# Patient Record
Sex: FEMALE | Race: WHITE | URBAN - METROPOLITAN AREA
[De-identification: names, ages, dates, MRNs, and addresses within clinical notes are randomized per-mention and may not be internally consistent; named-entity substitution may affect disease eponyms.]

---

## 2022-06-10 ENCOUNTER — PROCEDURE VISIT (OUTPATIENT)
Dept: OBGYN CLINIC | Age: 27
End: 2022-06-10
Payer: COMMERCIAL

## 2022-06-10 DIAGNOSIS — Z3A.08 8 WEEKS GESTATION OF PREGNANCY: ICD-10-CM

## 2022-06-10 DIAGNOSIS — O36.80X0 ENCOUNTER TO DETERMINE FETAL VIABILITY OF PREGNANCY, SINGLE OR UNSPECIFIED FETUS: Primary | ICD-10-CM

## 2022-06-10 PROCEDURE — 76817 TRANSVAGINAL US OBSTETRIC: CPT | Performed by: OBSTETRICS & GYNECOLOGY

## 2022-06-15 ENCOUNTER — INITIAL PRENATAL (OUTPATIENT)
Dept: OBGYN CLINIC | Age: 27
End: 2022-06-15

## 2022-06-15 VITALS — WEIGHT: 171.4 LBS | HEIGHT: 62 IN | BODY MASS INDEX: 31.54 KG/M2

## 2022-06-15 DIAGNOSIS — Z82.79 FAMILY HISTORY OF DOWN SYNDROME: ICD-10-CM

## 2022-06-15 DIAGNOSIS — Z82.49 FAMILY HISTORY OF HEART FAILURE: ICD-10-CM

## 2022-06-15 DIAGNOSIS — Z92.29 COVID-19 VACCINE SERIES COMPLETED: ICD-10-CM

## 2022-06-15 DIAGNOSIS — Z34.01 ENCOUNTER FOR SUPERVISION OF NORMAL FIRST PREGNANCY IN FIRST TRIMESTER: Primary | ICD-10-CM

## 2022-06-15 DIAGNOSIS — Z3A.09 9 WEEKS GESTATION OF PREGNANCY: ICD-10-CM

## 2022-06-15 PROBLEM — Z34.90 PREGNANCY: Status: ACTIVE | Noted: 2022-06-15

## 2022-06-15 RX ORDER — CALCIUM CARBONATE 200(500)MG
1 TABLET,CHEWABLE ORAL DAILY
COMMUNITY

## 2022-06-15 NOTE — PROGRESS NOTES
Christie Gruber is here today for NOB talk. Today we discussed scheduled appointments, scheduled ultrasounds, nutrition, appropriate weight gain, exercise, travel, genetic testing, hospital classes and registration, breastfeeding/lactation services, flu vaccine, Tdap, glucola, GBS, COVID 19, and Zika virus. She elects to have genetic testing,NIPT. Her father recently had a heart transplant for heart failure, it was discovered at that time that he has a genetic heart issue. At this time, she does not know what type of condition he has. She is scheduled to have genetic testing next month and will share her results once she knows more, as well as her father's results. Anatomy at [facility]. She will return tomorrow for her PN labs. She is asked to return to the office in 3 weeks for NOB exam. All questions answered. Pt is encouraged to call the office with any questions or concerns. Orders Placed This Encounter   Procedures    Culture, Urine     Standing Status:   Future     Standing Expiration Date:   6/15/2023     Order Specific Question:   Specify (ex-cath, midstream, cysto, etc)?      Answer:   midstream    CBC with Auto Differential     Standing Status:   Future     Standing Expiration Date:   6/15/2023    Hepatitis B Surface Antigen     Standing Status:   Future     Standing Expiration Date:   6/15/2023    Hepatitis C Antibody     Standing Status:   Future     Standing Expiration Date:   6/15/2023    HIV 1/2 Ag/Ab, 4TH Generation,W Rflx Confirm     Standing Status:   Future     Standing Expiration Date:   6/15/2023    RPR     Standing Status:   Future     Standing Expiration Date:   6/15/2023    Rubella antibody, IgG     Standing Status:   Future     Standing Expiration Date:   6/15/2023    ABO/Rh     Standing Status:   Future     Standing Expiration Date:   6/15/2023    Antibody Screen     Standing Status:   Future     Standing Expiration Date:   6/15/2023

## 2022-06-16 ENCOUNTER — NURSE ONLY (OUTPATIENT)
Dept: OBGYN CLINIC | Age: 27
End: 2022-06-16

## 2022-06-16 DIAGNOSIS — Z34.01 ENCOUNTER FOR SUPERVISION OF NORMAL FIRST PREGNANCY IN FIRST TRIMESTER: ICD-10-CM

## 2022-06-17 LAB
ABO + RH BLD: NORMAL
BLOOD GROUP ANTIBODIES SERPL: NORMAL

## 2022-06-18 LAB
BASOPHILS # BLD: 0.1 K/UL (ref 0–0.2)
BASOPHILS NFR BLD: 0 % (ref 0–2)
DIFFERENTIAL METHOD BLD: ABNORMAL
EOSINOPHIL # BLD: 0.2 K/UL (ref 0–0.8)
EOSINOPHIL NFR BLD: 1 % (ref 0.5–7.8)
ERYTHROCYTE [DISTWIDTH] IN BLOOD BY AUTOMATED COUNT: 13.6 % (ref 11.9–14.6)
HBV SURFACE AG SER QL: NONREACTIVE
HCT VFR BLD AUTO: 38.5 % (ref 35.8–46.3)
HCV AB SER QL: NONREACTIVE
HGB BLD-MCNC: 11.6 G/DL (ref 11.7–15.4)
HIV 1+2 AB+HIV1 P24 AG SERPL QL IA: NONREACTIVE
HIV 1/2 RESULT COMMENT: NORMAL
IMM GRANULOCYTES # BLD AUTO: 0.1 K/UL (ref 0–0.5)
IMM GRANULOCYTES NFR BLD AUTO: 0 % (ref 0–5)
LYMPHOCYTES # BLD: 2.7 K/UL (ref 0.5–4.6)
LYMPHOCYTES NFR BLD: 19 % (ref 13–44)
MCH RBC QN AUTO: 27 PG (ref 26.1–32.9)
MCHC RBC AUTO-ENTMCNC: 30.1 G/DL (ref 31.4–35)
MCV RBC AUTO: 89.5 FL (ref 79.6–97.8)
MONOCYTES # BLD: 0.9 K/UL (ref 0.1–1.3)
MONOCYTES NFR BLD: 6 % (ref 4–12)
NEUTS SEG # BLD: 10.5 K/UL (ref 1.7–8.2)
NEUTS SEG NFR BLD: 73 % (ref 43–78)
NRBC # BLD: 0 K/UL (ref 0–0.2)
PLATELET # BLD AUTO: 271 K/UL (ref 150–450)
PMV BLD AUTO: 10.2 FL (ref 9.4–12.3)
RBC # BLD AUTO: 4.3 M/UL (ref 4.05–5.2)
RPR SER QL: NONREACTIVE
RUBV IGG SERPL IA-ACNC: 17.6 IU/ML (ref 0–50)
WBC # BLD AUTO: 14.5 K/UL (ref 4.3–11.1)

## 2022-06-21 LAB
BACTERIA SPEC CULT: ABNORMAL
BACTERIA SPEC CULT: ABNORMAL
SERVICE CMNT-IMP: ABNORMAL

## 2022-06-22 ENCOUNTER — TELEPHONE (OUTPATIENT)
Dept: OBGYN CLINIC | Age: 27
End: 2022-06-22

## 2022-06-22 PROBLEM — N39.0 UTI (URINARY TRACT INFECTION): Status: ACTIVE | Noted: 2022-06-22

## 2022-06-22 RX ORDER — AMOXICILLIN 500 MG/1
500 CAPSULE ORAL 3 TIMES DAILY
Qty: 21 CAPSULE | Refills: 0 | Status: SHIPPED | OUTPATIENT
Start: 2022-06-22 | End: 2022-06-29

## 2022-06-22 NOTE — TELEPHONE ENCOUNTER
----- Message from Lindsey Isidro MD sent at 6/21/2022  8:24 AM EDT -----  Labs are abnormal.  Amoxil 500 TID for 7 days and re culture

## 2022-06-22 NOTE — TELEPHONE ENCOUNTER
Called pt, made aware of results and Dr Valerie Gan recommendations. Rx to be sent to her pharmacy. She is also c/o increased vomiting at night. She is going to try vitamin B6 and unisom. She is already taking a daily tagamet. She is asked to call back if her sx fail to improve. All questions answered. Call back prn. Voiced full understanding.      Orders Placed This Encounter    amoxicillin (AMOXIL) 500 MG capsule     Sig: Take 1 capsule by mouth 3 times daily for 7 days     Dispense:  21 capsule     Refill:  0

## 2022-06-28 ENCOUNTER — TELEPHONE (OUTPATIENT)
Dept: OBGYN CLINIC | Age: 27
End: 2022-06-28

## 2022-06-28 DIAGNOSIS — Z34.01 ENCOUNTER FOR SUPERVISION OF NORMAL FIRST PREGNANCY IN FIRST TRIMESTER: Primary | ICD-10-CM

## 2022-06-28 NOTE — TELEPHONE ENCOUNTER
----- Message from Quang Rosenbaum MD sent at 6/21/2022  8:24 AM EDT -----  Labs are abnormal.  Amoxil 500 TID for 7 days and re culture

## 2022-06-28 NOTE — TELEPHONE ENCOUNTER
Patient's PN labs were not picked up on 06/16/22, they were left in the box all night. Labs were not stable to run, pt will need to be redrawn. Per Dr Amanda Bardales she would prefer the patient have these labs done prior to her next appt. Called pt, made aware of above. She is going to come back in on Thursday, 06/30. She is aware that she should not get a bill for the blood work that was preformed 06/16. All questions answered call back prn. Voiced full understanding.      Orders Placed This Encounter    CBC with Auto Differential     Standing Status:   Future     Standing Expiration Date:   6/28/2023    Hepatitis B Surface Antigen     Standing Status:   Future     Standing Expiration Date:   6/28/2023    HIV 1/2 Ag/Ab, 4TH Generation,W Rflx Confirm     Standing Status:   Future     Standing Expiration Date:   6/28/2023    Rubella antibody, IgG     Standing Status:   Future     Standing Expiration Date:   6/28/2023    RPR     Standing Status:   Future     Standing Expiration Date:   6/28/2023    ABO/Rh     Standing Status:   Future     Standing Expiration Date:   6/28/2023    Antibody Screen     Standing Status:   Future     Standing Expiration Date:   6/28/2023

## 2022-06-30 ENCOUNTER — NURSE ONLY (OUTPATIENT)
Dept: OBGYN CLINIC | Age: 27
End: 2022-06-30

## 2022-06-30 DIAGNOSIS — Z34.01 ENCOUNTER FOR SUPERVISION OF NORMAL FIRST PREGNANCY IN FIRST TRIMESTER: ICD-10-CM

## 2022-06-30 LAB
ABO + RH BLD: NORMAL
BASOPHILS # BLD: 0 K/UL (ref 0–0.2)
BASOPHILS NFR BLD: 0 % (ref 0–2)
BLOOD GROUP ANTIBODIES SERPL: NORMAL
DIFFERENTIAL METHOD BLD: ABNORMAL
EOSINOPHIL # BLD: 0.1 K/UL (ref 0–0.8)
EOSINOPHIL NFR BLD: 1 % (ref 0.5–7.8)
ERYTHROCYTE [DISTWIDTH] IN BLOOD BY AUTOMATED COUNT: 12.9 % (ref 11.9–14.6)
HCT VFR BLD AUTO: 37.2 % (ref 35.8–46.3)
HGB BLD-MCNC: 12.4 G/DL (ref 11.7–15.4)
HIV 1+2 AB+HIV1 P24 AG SERPL QL IA: NONREACTIVE
HIV 1/2 RESULT COMMENT: NORMAL
IMM GRANULOCYTES # BLD AUTO: 0.1 K/UL (ref 0–0.5)
IMM GRANULOCYTES NFR BLD AUTO: 1 % (ref 0–5)
LYMPHOCYTES # BLD: 2.1 K/UL (ref 0.5–4.6)
LYMPHOCYTES NFR BLD: 17 % (ref 13–44)
MCH RBC QN AUTO: 26.8 PG (ref 26.1–32.9)
MCHC RBC AUTO-ENTMCNC: 33.3 G/DL (ref 31.4–35)
MCV RBC AUTO: 80.3 FL (ref 79.6–97.8)
MONOCYTES # BLD: 0.8 K/UL (ref 0.1–1.3)
MONOCYTES NFR BLD: 6 % (ref 4–12)
NEUTS SEG # BLD: 9.5 K/UL (ref 1.7–8.2)
NEUTS SEG NFR BLD: 76 % (ref 43–78)
NRBC # BLD: 0 K/UL (ref 0–0.2)
PLATELET # BLD AUTO: 299 K/UL (ref 150–450)
PMV BLD AUTO: 9.8 FL (ref 9.4–12.3)
RBC # BLD AUTO: 4.63 M/UL (ref 4.05–5.2)
WBC # BLD AUTO: 12.5 K/UL (ref 4.3–11.1)

## 2022-07-01 LAB
ABO, EXTERNAL RESULT: NORMAL
HBV SURFACE AG SER QL: NONREACTIVE
HEP B, EXTERNAL RESULT: NON REACTIVE
HIV, EXTERNAL RESULT: NON REACTIVE
RH FACTOR, EXTERNAL RESULT: POSITIVE
RPR, EXTERNAL RESULT: NON REACTIVE
RUBELLA TITER, EXTERNAL RESULT: NORMAL
RUBV IGG SERPL IA-ACNC: 19.4 IU/ML (ref 0–50)

## 2022-07-02 LAB — RPR SER QL: NON REACTIVE

## 2022-07-05 ENCOUNTER — PATIENT MESSAGE (OUTPATIENT)
Dept: OBGYN CLINIC | Age: 27
End: 2022-07-05

## 2022-07-06 RX ORDER — ONDANSETRON HYDROCHLORIDE 8 MG/1
8 TABLET, FILM COATED ORAL EVERY 8 HOURS PRN
Qty: 20 TABLET | Refills: 2 | Status: SHIPPED | OUTPATIENT
Start: 2022-07-06

## 2022-07-06 NOTE — PROGRESS NOTES
Chief Complaint: This 31 y.o. female presents today for an initial obstetrical examination. She c/o nausea and vomiting sent in zofran yesterday and this is helping. She is having headaches as well. Denies cramping, ctx, bleeding, spotting, bleeding. Dad being tested and she is being tested- already swabbed- - \" gene of undetermined significance\"- father with heart transplant Goes to [address] [place] [address] in [facility] will be followed by those cardiologists- new drug in development-  Last pap 21 wnl, cultures neg  Not enough urine sample to check-will attempt again before leaving today   Genetic test re heart condition pending      No components found for: Eliz Givens, OBEXTRUBELLA, OBEXTGRBS, OBEXTHBSAG, OBEXTHIV, OBEXTRPR, OBEXTTPPA, [address], [address] [facility], [address], [id number]        LMP:  Patient's last menstrual period was 2022. EDC:  Estimated Date of Delivery: 23    OB History:    OB History    Para Term  AB Living   1   0 0 0 0   SAB IAB Ectopic Molar Multiple Live Births                    # Outcome Date GA Lbr Guy/2nd Weight Sex Delivery Anes PTL Lv   1 Current               Allergies:  No Known Allergies    Medication History:  Current Outpatient Medications   Medication Sig Dispense Refill    ondansetron (ZOFRAN) 8 MG tablet Take 1 tablet by mouth every 8 hours as needed for Nausea or Vomiting 20 tablet 2    Prenat w/o K-GX-Vtcxigc-FA-DHA (PNV-DHA PO) Take by mouth      calcium carbonate (TUMS) 500 MG chewable tablet Take 1 tablet by mouth daily       No current facility-administered medications for this visit.        Past Medical History:  Past Medical History:   Diagnosis Date    COVID        Past Surgical History:  Past Surgical History:   Procedure Laterality Date    WISDOM TOOTH EXTRACTION         Social History:  Social History     Socioeconomic History    Marital status:      Spouse name: Not on file    Number of children: Not on file  Years of education: Not on file    Highest education level: Not on file   Occupational History    Not on file   Tobacco Use    Smoking status: Never Smoker    Smokeless tobacco: Never Used   Vaping Use    Vaping Use: Never used   Substance and Sexual Activity    Alcohol use: Not Currently    Drug use: No    Sexual activity: Yes     Partners: Male     Birth control/protection: None   Other Topics Concern    Not on file   Social History Narrative    Not on file     Social Determinants of Health     Financial Resource Strain:     Difficulty of Paying Living Expenses: Not on file   Food Insecurity:     Worried About Running Out of Food in the Last Year: Not on file    Christi of Food in the Last Year: Not on file   Transportation Needs:     Lack of Transportation (Medical): Not on file    Lack of Transportation (Non-Medical):  Not on file   Physical Activity:     Days of Exercise per Week: Not on file    Minutes of Exercise per Session: Not on file   Stress:     Feeling of Stress : Not on file   Social Connections:     Frequency of Communication with Friends and Family: Not on file    Frequency of Social Gatherings with Friends and Family: Not on file    Attends Restorationism Services: Not on file    Active Member of [address] [place] [address] [employer] or Organizations: Not on file    Attends Club or Organization Meetings: Not on file    Marital Status: Not on file   Intimate Partner Violence:     Fear of Current or Ex-Partner: Not on file    Emotionally Abused: Not on file    Physically Abused: Not on file    Sexually Abused: Not on file   Housing Stability:     Unable to Pay for Housing in the Last Year: Not on file    Number of [place] in the Last Year: Not on file    Unstable Housing in the Last Year: Not on file       Family History:  Family History   Problem Relation Age of Onset    Heart Disease Mother     Heart Failure Father         heart transplant    Heart Attack Mother 40       Ultrasound results: normal    Review of Systems   Constitutional: Negative. HENT: Negative. Eyes: Negative. Respiratory: Negative. Cardiovascular: Negative. Gastrointestinal: negative  Genitourinary: Negative. Musculoskeletal: Negative. Skin: Negative. Neurological: Negative. Endo/Heme/Allergies: Negative. Psychiatric/Behavioral: Negative. LMP 04/14/2022     Physical Exam:  Patient is a 31 y.o.  female who appears pleasant, in no apparent distress, her given age, well developed, well nourished and with good attention to hygiene and body habitus. Oriented to person, place and time. Mood and affect normal and appropriate to situation. Head is normocephalic, atraumatic, without any gross head or neck masses. HEENT:Head & Face: Examination of head and face is unremarkable  Thyroid is smooth and symmetric with enlargement consistent with pregnancy, no tenderness or masses noted. No neck lymphadenopathy noted. No supraclavicular lymphadenopathy noted. Skin: No skin rash, subcutaneous nodules, lesions or ulcers observed. No edema observed. Cardiovascular: Heart auscultation reveals no murmurs, gallop, rubs or clicks. Respiratory: Lungs CTA. Breast: Chest (Breasts): Symmetrical, no: dimpling, retractions, adenopathy, dominant masses or nipple discharge elicited. No axillary lymphadenopathy noted. Fibrocystic change:  yes  Abdomen: Abdomen soft, nontender, bowel sounds present x 4 without palpable masses. Palpation of liver reveals no abnormalities with respect to size, tenderness or masses. Palpation of spleen reveals no abnormalities with respect to size, tenderness or masses. No groin lymphadenopathy noted. Pelvic Dimensions:       Arch is normal.    Gynecoid is yes. Genitourinary: External genitalia are normal in appearance. Examination of urethra shows no abnormalities. Examination of vaginal vault reveals no abnormalities.    Cervix is long and closed without pathology. Uterine portion of bimanual exam reveals contour normal, shape regular and size normal for gestation. ~ 12 wk size. Adnexa and parametria show no masses, tenderness, organomegaly or nodularity. Examination of anus and perineum shows no abnormalities. Rectal exam reveals normal sphincter tone without presence of hemorrhoids or masses. Test Results:     Impression: Patient is pregnant. 2.  Flu shot  advised/offered, accepted  3. Dtap vaccine advised offered after 28 weeks accepted    Plan: Pap smear done 12/21     Counseling:  adverse effects of alcohol, breast vs bottle feeding, childbirth classes, environment or work hazards, lifestyle changes, negative effects of smoking, physical activity, prenatal nutrition, sexual activity, toxoplasmosis precautions which includes avoidance of cat feces and raw material, listeria precautions, traveling, screening for chromosomal and genetic problems, Nuchal translucency, MSAFP testing, peripheral maternal blood draw for fetal cells and chromosomes, pros & cons of testing reviewed. Patient accepted testing. Sending to [facility] for genetic testing and anatomy secondary to [facility] DS and genetic heart condition              Discussed diet, exercise and recommended weight gain. All questions answered. Check urine culture    Encounter Diagnoses   Name Primary?  11 weeks gestation of pregnancy     Encounter for supervision of normal first pregnancy in first trimester Yes    Screening for genitourinary condition     Screening examination for venereal disease      No follow-up provider specified.

## 2022-07-06 NOTE — TELEPHONE ENCOUNTER
From: Norberto Keene  To: Dr. Small Breaker: 7/5/2022 6:53 PM EDT  Subject: Nausea    My nausea has continually gotten worse (only occurs in the afternoon/evenings). I was trying to wait it out until I got off my antibiotic for the UTI but it has continually gotten worse even after those are over. Is there anything else I can take or do to relieve the symptoms?

## 2022-07-07 ENCOUNTER — ROUTINE PRENATAL (OUTPATIENT)
Dept: OBGYN CLINIC | Age: 27
End: 2022-07-07
Payer: COMMERCIAL

## 2022-07-07 VITALS
BODY MASS INDEX: 31.91 KG/M2 | WEIGHT: 173.4 LBS | SYSTOLIC BLOOD PRESSURE: 116 MMHG | DIASTOLIC BLOOD PRESSURE: 80 MMHG | HEIGHT: 62 IN

## 2022-07-07 DIAGNOSIS — Z82.79 FAMILY HISTORY OF DOWN SYNDROME: ICD-10-CM

## 2022-07-07 DIAGNOSIS — Z34.01 ENCOUNTER FOR SUPERVISION OF NORMAL FIRST PREGNANCY IN FIRST TRIMESTER: Primary | ICD-10-CM

## 2022-07-07 DIAGNOSIS — Z11.3 SCREENING EXAMINATION FOR VENEREAL DISEASE: ICD-10-CM

## 2022-07-07 DIAGNOSIS — Z3A.11 11 WEEKS GESTATION OF PREGNANCY: ICD-10-CM

## 2022-07-07 DIAGNOSIS — Z13.89 SCREENING FOR GENITOURINARY CONDITION: ICD-10-CM

## 2022-07-07 LAB
GLUCOSE URINE, POC: NEGATIVE
PROTEIN,URINE, POC: NEGATIVE

## 2022-07-07 PROCEDURE — 81002 URINALYSIS NONAUTO W/O SCOPE: CPT | Performed by: OBSTETRICS & GYNECOLOGY

## 2022-07-07 PROCEDURE — 99902 PR PRENATAL VISIT: CPT | Performed by: OBSTETRICS & GYNECOLOGY

## 2022-07-11 PROBLEM — O98.511 COVID-19 AFFECTING PREGNANCY IN FIRST TRIMESTER: Status: ACTIVE | Noted: 2022-07-11

## 2022-07-11 PROBLEM — U07.1 COVID-19 AFFECTING PREGNANCY IN FIRST TRIMESTER: Status: ACTIVE | Noted: 2022-07-11

## 2022-07-11 LAB
C TRACH RRNA SPEC QL NAA+PROBE: NEGATIVE
N GONORRHOEA RRNA SPEC QL NAA+PROBE: NEGATIVE
SPECIMEN SOURCE: NORMAL
T VAGINALIS RRNA SPEC QL NAA+PROBE: NEGATIVE

## 2022-07-22 PROBLEM — N39.0 UTI (URINARY TRACT INFECTION): Status: RESOLVED | Noted: 2022-06-22 | Resolved: 2022-07-22

## 2022-08-05 ENCOUNTER — ROUTINE PRENATAL (OUTPATIENT)
Dept: OBGYN CLINIC | Age: 27
End: 2022-08-05
Payer: COMMERCIAL

## 2022-08-05 VITALS
BODY MASS INDEX: 33.27 KG/M2 | SYSTOLIC BLOOD PRESSURE: 116 MMHG | DIASTOLIC BLOOD PRESSURE: 74 MMHG | HEIGHT: 62 IN | WEIGHT: 180.8 LBS

## 2022-08-05 DIAGNOSIS — Z34.02 ENCOUNTER FOR SUPERVISION OF NORMAL FIRST PREGNANCY IN SECOND TRIMESTER: Primary | ICD-10-CM

## 2022-08-05 DIAGNOSIS — Z13.89 SCREENING FOR GENITOURINARY CONDITION: ICD-10-CM

## 2022-08-05 DIAGNOSIS — Z3A.16 16 WEEKS GESTATION OF PREGNANCY: ICD-10-CM

## 2022-08-05 LAB
GLUCOSE URINE, POC: NEGATIVE
PROTEIN,URINE, POC: NEGATIVE

## 2022-08-05 PROCEDURE — 99902 PR PRENATAL VISIT: CPT | Performed by: OBSTETRICS & GYNECOLOGY

## 2022-08-05 PROCEDURE — 81002 URINALYSIS NONAUTO W/O SCOPE: CPT | Performed by: OBSTETRICS & GYNECOLOGY

## 2022-08-05 RX ORDER — ACETAMINOPHEN 160 MG
TABLET,DISINTEGRATING ORAL
COMMUNITY

## 2022-08-05 NOTE — PROGRESS NOTES
C/o n/v but improving, headaches    Denies bleeding, spotting, cramping, ctx    Has questions regarding supplements, she had covid and added aspirin up to 162 mg daily, vitamin c, wants to know if she should bring aspirin dose down to 81 mg, she does not drink a lot of milk, would like to know if she should add calcium supplement

## 2022-09-01 ENCOUNTER — ROUTINE PRENATAL (OUTPATIENT)
Dept: OBGYN CLINIC | Age: 27
End: 2022-09-01
Payer: COMMERCIAL

## 2022-09-01 VITALS
BODY MASS INDEX: 33.27 KG/M2 | WEIGHT: 180.8 LBS | DIASTOLIC BLOOD PRESSURE: 74 MMHG | HEIGHT: 62 IN | SYSTOLIC BLOOD PRESSURE: 104 MMHG

## 2022-09-01 DIAGNOSIS — Z3A.20 20 WEEKS GESTATION OF PREGNANCY: ICD-10-CM

## 2022-09-01 DIAGNOSIS — Z13.89 SCREENING FOR GENITOURINARY CONDITION: ICD-10-CM

## 2022-09-01 DIAGNOSIS — Z36.3 ENCOUNTER FOR ROUTINE SCREENING FOR FETAL MALFORMATION USING ULTRASOUND: Primary | ICD-10-CM

## 2022-09-01 DIAGNOSIS — Z34.02 ENCOUNTER FOR SUPERVISION OF NORMAL FIRST PREGNANCY IN SECOND TRIMESTER: ICD-10-CM

## 2022-09-01 LAB
GLUCOSE URINE, POC: NEGATIVE
PROTEIN,URINE, POC: NEGATIVE

## 2022-09-01 PROCEDURE — 81002 URINALYSIS NONAUTO W/O SCOPE: CPT | Performed by: OBSTETRICS & GYNECOLOGY

## 2022-09-01 PROCEDURE — 76805 OB US >/= 14 WKS SNGL FETUS: CPT | Performed by: OBSTETRICS & GYNECOLOGY

## 2022-09-01 PROCEDURE — 99902 PR PRENATAL VISIT: CPT | Performed by: OBSTETRICS & GYNECOLOGY

## 2022-09-01 NOTE — PROGRESS NOTES
Anatomy scan today-need fu with MFM    Denies n/v, bleeding, spotting    Occasional headaches helped with caffeine, light cramping

## 2022-09-01 NOTE — PROGRESS NOTES
RACIEL LOWE, PATRICE. Normal anatomy, growth, placenta and cervix. Incomplete CV and spine. Recheck at 28 weeks with GTT. Discussed her father's cardiac/genetic condition. She feels reassured that she is fine. Will defer MFM referral at this point unless indication arises.

## 2022-09-29 ENCOUNTER — ROUTINE PRENATAL (OUTPATIENT)
Dept: OBGYN CLINIC | Age: 27
End: 2022-09-29
Payer: COMMERCIAL

## 2022-09-29 VITALS
WEIGHT: 188.4 LBS | DIASTOLIC BLOOD PRESSURE: 70 MMHG | HEIGHT: 62 IN | SYSTOLIC BLOOD PRESSURE: 102 MMHG | BODY MASS INDEX: 34.67 KG/M2

## 2022-09-29 DIAGNOSIS — R04.0 NOSEBLEED: ICD-10-CM

## 2022-09-29 DIAGNOSIS — Z13.89 SCREENING FOR GENITOURINARY CONDITION: ICD-10-CM

## 2022-09-29 DIAGNOSIS — Z34.02 ENCOUNTER FOR SUPERVISION OF NORMAL FIRST PREGNANCY IN SECOND TRIMESTER: Primary | ICD-10-CM

## 2022-09-29 DIAGNOSIS — Z3A.24 24 WEEKS GESTATION OF PREGNANCY: ICD-10-CM

## 2022-09-29 LAB
BASOPHILS # BLD: 0 K/UL (ref 0–0.2)
BASOPHILS NFR BLD: 0 % (ref 0–2)
DIFFERENTIAL METHOD BLD: ABNORMAL
EOSINOPHIL # BLD: 0.1 K/UL (ref 0–0.8)
EOSINOPHIL NFR BLD: 1 % (ref 0.5–7.8)
ERYTHROCYTE [DISTWIDTH] IN BLOOD BY AUTOMATED COUNT: 13.2 % (ref 11.9–14.6)
GLUCOSE URINE, POC: NEGATIVE
HCT VFR BLD AUTO: 33.1 % (ref 35.8–46.3)
HGB BLD-MCNC: 10.7 G/DL (ref 11.7–15.4)
IMM GRANULOCYTES # BLD AUTO: 0.1 K/UL (ref 0–0.5)
IMM GRANULOCYTES NFR BLD AUTO: 1 % (ref 0–5)
LYMPHOCYTES # BLD: 1.9 K/UL (ref 0.5–4.6)
LYMPHOCYTES NFR BLD: 15 % (ref 13–44)
MCH RBC QN AUTO: 27.6 PG (ref 26.1–32.9)
MCHC RBC AUTO-ENTMCNC: 32.3 G/DL (ref 31.4–35)
MCV RBC AUTO: 85.3 FL (ref 79.6–97.8)
MONOCYTES # BLD: 0.8 K/UL (ref 0.1–1.3)
MONOCYTES NFR BLD: 6 % (ref 4–12)
NEUTS SEG # BLD: 9.7 K/UL (ref 1.7–8.2)
NEUTS SEG NFR BLD: 77 % (ref 43–78)
NRBC # BLD: 0 K/UL (ref 0–0.2)
PLATELET # BLD AUTO: 253 K/UL (ref 150–450)
PMV BLD AUTO: 9.5 FL (ref 9.4–12.3)
PROTEIN,URINE, POC: NEGATIVE
RBC # BLD AUTO: 3.88 M/UL (ref 4.05–5.2)
WBC # BLD AUTO: 12.6 K/UL (ref 4.3–11.1)

## 2022-09-29 PROCEDURE — 99902 PR PRENATAL VISIT: CPT | Performed by: OBSTETRICS & GYNECOLOGY

## 2022-09-29 PROCEDURE — 81002 URINALYSIS NONAUTO W/O SCOPE: CPT | Performed by: OBSTETRICS & GYNECOLOGY

## 2022-09-29 RX ORDER — ESOMEPRAZOLE MAGNESIUM 40 MG/1
40 CAPSULE, DELAYED RELEASE ORAL DAILY
Qty: 30 CAPSULE | Refills: 3 | Status: SHIPPED | OUTPATIENT
Start: 2022-09-29

## 2022-09-29 NOTE — PROGRESS NOTES
Will check CBC w/ PLTS d/t nose bleeds, trying saline spray now; if no better can refer to ENT  Rx Nexium, tagamet not helping  Glucola/anatomy ( recheck ) 4 weeks

## 2022-09-29 NOTE — PROGRESS NOTES
C/o nosebleeds and coughing up thick clots of blood, breast becoming more painful, specifically her left breast. She also states that she has had a cough consistently that is dry since she had COVID, primarily worse at night, is having acid reflux and worse at night eating tums, takes a tagamet in the morning and wants to know if she can take this at night or if she should switch to something twice per day     Denies headaches, n/v, cramping, ctx, bleeding, spotting

## 2022-10-03 PROBLEM — O99.012 ANEMIA AFFECTING PREGNANCY IN SECOND TRIMESTER: Status: ACTIVE | Noted: 2022-10-03

## 2022-10-27 ENCOUNTER — ROUTINE PRENATAL (OUTPATIENT)
Dept: OBGYN CLINIC | Age: 27
End: 2022-10-27
Payer: COMMERCIAL

## 2022-10-27 VITALS
HEIGHT: 62 IN | DIASTOLIC BLOOD PRESSURE: 66 MMHG | WEIGHT: 194.4 LBS | SYSTOLIC BLOOD PRESSURE: 116 MMHG | BODY MASS INDEX: 35.77 KG/M2

## 2022-10-27 DIAGNOSIS — Z13.89 SCREENING FOR GENITOURINARY CONDITION: ICD-10-CM

## 2022-10-27 DIAGNOSIS — Z34.03 ENCOUNTER FOR SUPERVISION OF NORMAL FIRST PREGNANCY IN THIRD TRIMESTER: Primary | ICD-10-CM

## 2022-10-27 DIAGNOSIS — Z82.79 FAMILY HISTORY OF DOWN SYNDROME: ICD-10-CM

## 2022-10-27 DIAGNOSIS — Z23 ENCOUNTER FOR IMMUNIZATION: ICD-10-CM

## 2022-10-27 DIAGNOSIS — Z3A.28 28 WEEKS GESTATION OF PREGNANCY: ICD-10-CM

## 2022-10-27 DIAGNOSIS — Z13.1 SCREENING FOR DIABETES MELLITUS: ICD-10-CM

## 2022-10-27 DIAGNOSIS — Z13.0 SCREENING FOR IRON DEFICIENCY ANEMIA: ICD-10-CM

## 2022-10-27 LAB
GLUCOSE URINE, POC: NORMAL
HGB BLD-MCNC: 11.5 G/DL (ref 11.7–15.4)
PROTEIN,URINE, POC: NEGATIVE

## 2022-10-27 PROCEDURE — 90674 CCIIV4 VAC NO PRSV 0.5 ML IM: CPT | Performed by: OBSTETRICS & GYNECOLOGY

## 2022-10-27 PROCEDURE — 81002 URINALYSIS NONAUTO W/O SCOPE: CPT | Performed by: OBSTETRICS & GYNECOLOGY

## 2022-10-27 PROCEDURE — 90471 IMMUNIZATION ADMIN: CPT | Performed by: OBSTETRICS & GYNECOLOGY

## 2022-10-27 PROCEDURE — 99902 PR PRENATAL VISIT: CPT | Performed by: OBSTETRICS & GYNECOLOGY

## 2022-10-27 NOTE — PROGRESS NOTES
Patient Active Problem List    Diagnosis Date Noted    Anemia affecting pregnancy in second trimester 10/03/2022    COVID-19 affecting pregnancy in first trimester 07/11/2022    Pregnancy 06/15/2022    COVID-19 vaccine series completed 06/15/2022    Family history of heart failure 06/15/2022    Family history of Down syndrome 06/15/2022   Discussed significant  Weight gain - will decrease carbs  GFM  Will come back for anatomy asap

## 2022-10-27 NOTE — PROGRESS NOTES
Denies ha, n/v, bleeding, cramping    1 hour gtt today    Needs follow up anatomy    Flu vaccine today

## 2022-10-28 ENCOUNTER — ROUTINE PRENATAL (OUTPATIENT)
Dept: OBGYN CLINIC | Age: 27
End: 2022-10-28
Payer: COMMERCIAL

## 2022-10-28 VITALS
WEIGHT: 191.2 LBS | BODY MASS INDEX: 35.19 KG/M2 | SYSTOLIC BLOOD PRESSURE: 102 MMHG | DIASTOLIC BLOOD PRESSURE: 64 MMHG | HEIGHT: 62 IN

## 2022-10-28 DIAGNOSIS — Z3A.28 28 WEEKS GESTATION OF PREGNANCY: ICD-10-CM

## 2022-10-28 DIAGNOSIS — O99.012 ANEMIA AFFECTING PREGNANCY IN SECOND TRIMESTER: ICD-10-CM

## 2022-10-28 DIAGNOSIS — Z13.89 SCREENING FOR GENITOURINARY CONDITION: ICD-10-CM

## 2022-10-28 DIAGNOSIS — Z36.2 ENCOUNTER FOR FOLLOW-UP ULTRASOUND OF FETAL ANATOMY: ICD-10-CM

## 2022-10-28 DIAGNOSIS — Z34.03 ENCOUNTER FOR SUPERVISION OF NORMAL FIRST PREGNANCY IN THIRD TRIMESTER: Primary | ICD-10-CM

## 2022-10-28 LAB
GLUCOSE 1 HOUR: 149 MG/DL
GLUCOSE URINE, POC: NORMAL
PROTEIN,URINE, POC: NEGATIVE

## 2022-10-28 PROCEDURE — 99902 PR PRENATAL VISIT: CPT | Performed by: OBSTETRICS & GYNECOLOGY

## 2022-10-28 PROCEDURE — 76816 OB US FOLLOW-UP PER FETUS: CPT | Performed by: OBSTETRICS & GYNECOLOGY

## 2022-10-28 PROCEDURE — 81002 URINALYSIS NONAUTO W/O SCOPE: CPT | Performed by: OBSTETRICS & GYNECOLOGY

## 2022-10-28 NOTE — PROGRESS NOTES
Follow up anatomy today    Was seen yesterday for OB visit, brought back today for ultrasound.    1 hour gtt still pending

## 2022-10-28 NOTE — PROGRESS NOTES
Us with ac 60%. Female /vertex. All anatomy appears wnl. Posterior placenta. rtc 3 weeks. 11.5 hg yesterday. Still continue some iron. glucola still processing. Glucosuria today - ate sugar so baby would move for us.

## 2022-11-03 ENCOUNTER — NURSE ONLY (OUTPATIENT)
Dept: OBGYN CLINIC | Age: 27
End: 2022-11-03

## 2022-11-03 DIAGNOSIS — Z13.1 SCREENING FOR DIABETES MELLITUS: ICD-10-CM

## 2022-11-03 DIAGNOSIS — Z13.1 SCREENING FOR DIABETES MELLITUS: Primary | ICD-10-CM

## 2022-11-04 LAB
GESTATIONAL 3HR GTT: NORMAL
GLUCOSE 1H P 100 G GLC PO SERPL-MCNC: 148 MG/DL (ref 65–180)
GLUCOSE 2 HOUR: 119 MG/DL (ref 65–155)
GLUCOSE P FAST SERPL-MCNC: 81 MG/DL (ref 65–95)
GLUCOSE, 3 HOUR: 97 MG/DL (ref 65–140)

## 2022-11-15 ENCOUNTER — ROUTINE PRENATAL (OUTPATIENT)
Dept: OBGYN CLINIC | Age: 27
End: 2022-11-15
Payer: COMMERCIAL

## 2022-11-15 VITALS
BODY MASS INDEX: 35.66 KG/M2 | HEIGHT: 62 IN | SYSTOLIC BLOOD PRESSURE: 108 MMHG | DIASTOLIC BLOOD PRESSURE: 72 MMHG | WEIGHT: 193.8 LBS

## 2022-11-15 DIAGNOSIS — Z3A.30 30 WEEKS GESTATION OF PREGNANCY: ICD-10-CM

## 2022-11-15 DIAGNOSIS — Z23 ENCOUNTER FOR IMMUNIZATION: ICD-10-CM

## 2022-11-15 DIAGNOSIS — O99.012 ANEMIA AFFECTING PREGNANCY IN SECOND TRIMESTER: ICD-10-CM

## 2022-11-15 DIAGNOSIS — Z34.03 ENCOUNTER FOR SUPERVISION OF NORMAL FIRST PREGNANCY IN THIRD TRIMESTER: Primary | ICD-10-CM

## 2022-11-15 DIAGNOSIS — Z13.89 SCREENING FOR GENITOURINARY CONDITION: ICD-10-CM

## 2022-11-15 LAB
GLUCOSE URINE, POC: NORMAL
PROTEIN,URINE, POC: NEGATIVE

## 2022-11-15 PROCEDURE — 90471 IMMUNIZATION ADMIN: CPT | Performed by: OBSTETRICS & GYNECOLOGY

## 2022-11-15 PROCEDURE — 81002 URINALYSIS NONAUTO W/O SCOPE: CPT | Performed by: OBSTETRICS & GYNECOLOGY

## 2022-11-15 PROCEDURE — 99902 PR PRENATAL VISIT: CPT | Performed by: OBSTETRICS & GYNECOLOGY

## 2022-11-15 PROCEDURE — 90715 TDAP VACCINE 7 YRS/> IM: CPT | Performed by: OBSTETRICS & GYNECOLOGY

## 2022-11-15 NOTE — PROGRESS NOTES
Passed 3 hour  22 pound weight gain  No carbs this am  Has had cramping with running  Watch diet- low carb

## 2022-11-15 NOTE — PROGRESS NOTES
Patient Active Problem List    Diagnosis Date Noted    Anemia affecting pregnancy in second trimester 10/03/2022    COVID-19 affecting pregnancy in first trimester 07/11/2022    Pregnancy 06/15/2022    COVID-19 vaccine series completed 06/15/2022    Family history of heart failure 06/15/2022    Family history of Down syndrome 06/15/2022

## 2022-11-15 NOTE — PROGRESS NOTES
Tdap-    C/o cramping during running, she has stopped running now but is still walking and having cramping occasionally sates that these feel like cramps she would have before being pregnant, increased pelvic pressure occasionally but states not bad     Denies bleeding, spotting, n/v, headaches

## 2022-11-28 ENCOUNTER — ROUTINE PRENATAL (OUTPATIENT)
Dept: OBGYN CLINIC | Age: 27
End: 2022-11-28
Payer: COMMERCIAL

## 2022-11-28 VITALS
BODY MASS INDEX: 36.33 KG/M2 | SYSTOLIC BLOOD PRESSURE: 114 MMHG | WEIGHT: 197.4 LBS | DIASTOLIC BLOOD PRESSURE: 72 MMHG | HEIGHT: 62 IN

## 2022-11-28 DIAGNOSIS — Z13.89 SCREENING FOR GENITOURINARY CONDITION: ICD-10-CM

## 2022-11-28 DIAGNOSIS — Z3A.32 32 WEEKS GESTATION OF PREGNANCY: ICD-10-CM

## 2022-11-28 DIAGNOSIS — Z34.03 ENCOUNTER FOR SUPERVISION OF NORMAL FIRST PREGNANCY IN THIRD TRIMESTER: Primary | ICD-10-CM

## 2022-11-28 LAB
GLUCOSE URINE, POC: NORMAL
PROTEIN,URINE, POC: NEGATIVE

## 2022-11-28 PROCEDURE — 99902 PR PRENATAL VISIT: CPT | Performed by: OBSTETRICS & GYNECOLOGY

## 2022-11-28 PROCEDURE — 81002 URINALYSIS NONAUTO W/O SCOPE: CPT | Performed by: OBSTETRICS & GYNECOLOGY

## 2022-11-28 NOTE — PROGRESS NOTES
Needs a growth at 34 weeks d/t hx of covid     Denies n/v, headaches, bleeding, spotting     C/o some mild cramping but typically only when walking fast

## 2022-12-14 ENCOUNTER — ROUTINE PRENATAL (OUTPATIENT)
Dept: OBGYN CLINIC | Age: 27
End: 2022-12-14
Payer: COMMERCIAL

## 2022-12-14 VITALS
SYSTOLIC BLOOD PRESSURE: 112 MMHG | DIASTOLIC BLOOD PRESSURE: 72 MMHG | BODY MASS INDEX: 36.33 KG/M2 | WEIGHT: 197.4 LBS | HEIGHT: 62 IN

## 2022-12-14 DIAGNOSIS — R81 GLUCOSURIA: ICD-10-CM

## 2022-12-14 DIAGNOSIS — Z3A.34 34 WEEKS GESTATION OF PREGNANCY: ICD-10-CM

## 2022-12-14 DIAGNOSIS — O98.511 COVID-19 AFFECTING PREGNANCY IN FIRST TRIMESTER: Primary | ICD-10-CM

## 2022-12-14 DIAGNOSIS — Z13.89 SCREENING FOR GENITOURINARY CONDITION: ICD-10-CM

## 2022-12-14 DIAGNOSIS — U07.1 COVID-19 AFFECTING PREGNANCY IN FIRST TRIMESTER: Primary | ICD-10-CM

## 2022-12-14 DIAGNOSIS — Z34.03 ENCOUNTER FOR SUPERVISION OF NORMAL FIRST PREGNANCY IN THIRD TRIMESTER: ICD-10-CM

## 2022-12-14 LAB
GLUCOSE DOSE-GTT, POC: 86
GLUCOSE URINE, POC: NEGATIVE
PROTEIN,URINE, POC: NORMAL

## 2022-12-14 PROCEDURE — 81002 URINALYSIS NONAUTO W/O SCOPE: CPT | Performed by: OBSTETRICS & GYNECOLOGY

## 2022-12-14 PROCEDURE — 76816 OB US FOLLOW-UP PER FETUS: CPT | Performed by: OBSTETRICS & GYNECOLOGY

## 2022-12-14 PROCEDURE — 82950 GLUCOSE TEST: CPT | Performed by: OBSTETRICS & GYNECOLOGY

## 2022-12-14 PROCEDURE — 99902 PR PRENATAL VISIT: CPT | Performed by: OBSTETRICS & GYNECOLOGY

## 2022-12-14 RX ORDER — ESOMEPRAZOLE MAGNESIUM 40 MG/1
CAPSULE, DELAYED RELEASE ORAL
Qty: 90 CAPSULE | Refills: 1 | OUTPATIENT
Start: 2022-12-14

## 2022-12-14 NOTE — PROGRESS NOTES
3Glucosuria  Patient Active Problem List    Diagnosis Date Noted    Anemia affecting pregnancy in second trimester 10/03/2022    COVID-19 affecting pregnancy in first trimester 07/11/2022    Pregnancy 06/15/2022    COVID-19 vaccine series completed 06/15/2022    Family history of heart failure 06/15/2022    Family history of Down syndrome 06/15/2022   EFW 39% AC 39% BPP 8/8 jia 11  GFM  Will check Glucose today continues to have glucosuria fasting today

## 2022-12-14 NOTE — PROGRESS NOTES
She is having a lot of cramping- not sure if she is experiencing lightening crotch    She c/o hemorrhoids- using tucks and preparation H    EFW today    Denies ha, n/v, bleeding

## 2022-12-15 ENCOUNTER — PATIENT MESSAGE (OUTPATIENT)
Dept: OBGYN CLINIC | Age: 27
End: 2022-12-15

## 2022-12-15 RX ORDER — HYDROCORTISONE ACETATE PRAMOXINE HCL 2.5; 1 G/100G; G/100G
CREAM TOPICAL 3 TIMES DAILY
Qty: 30 G | Refills: 1 | Status: SHIPPED | OUTPATIENT
Start: 2022-12-15

## 2022-12-15 NOTE — TELEPHONE ENCOUNTER
From: Cyrus Giles  To: Dr. Angus Lopez: 12/15/2022 7:28 AM EST  Subject: Hemorrhoids    I was in the office yesterday for an appointment and let you all know that I was experiencing hemorrhoids. I wanted to follow up, as I was up 3 different times during the night in the bath and applying over the counter medication because they hurt so bad. Im not sure if this is normal. Is there anything additional that I can be doing besides Preparation H, Tucks, and sitz baths? Any direction would be great! It was like they were getting worse and worse.

## 2022-12-15 NOTE — TELEPHONE ENCOUNTER
Orders Placed This Encounter    Hydrocort-Pramoxine, Perianal, (ANALPRAM HC) 2.5-1 % rectal cream     Sig: Place rectally 3 times daily     Dispense:  30 g     Refill:  1

## 2022-12-21 ENCOUNTER — ROUTINE PRENATAL (OUTPATIENT)
Dept: OBGYN CLINIC | Age: 27
End: 2022-12-21
Payer: COMMERCIAL

## 2022-12-21 VITALS — DIASTOLIC BLOOD PRESSURE: 72 MMHG | WEIGHT: 196.8 LBS | BODY MASS INDEX: 36 KG/M2 | SYSTOLIC BLOOD PRESSURE: 106 MMHG

## 2022-12-21 DIAGNOSIS — Z13.89 SCREENING FOR GENITOURINARY CONDITION: ICD-10-CM

## 2022-12-21 DIAGNOSIS — Z34.03 ENCOUNTER FOR SUPERVISION OF NORMAL FIRST PREGNANCY IN THIRD TRIMESTER: Primary | ICD-10-CM

## 2022-12-21 DIAGNOSIS — Z3A.35 35 WEEKS GESTATION OF PREGNANCY: ICD-10-CM

## 2022-12-21 DIAGNOSIS — U07.1 COVID-19 AFFECTING PREGNANCY IN FIRST TRIMESTER: ICD-10-CM

## 2022-12-21 DIAGNOSIS — O99.012 ANEMIA AFFECTING PREGNANCY IN SECOND TRIMESTER: ICD-10-CM

## 2022-12-21 DIAGNOSIS — O98.511 COVID-19 AFFECTING PREGNANCY IN FIRST TRIMESTER: ICD-10-CM

## 2022-12-21 LAB
GLUCOSE URINE, POC: NORMAL
PROTEIN,URINE, POC: NEGATIVE

## 2022-12-21 PROCEDURE — 99902 PR PRENATAL VISIT: CPT | Performed by: STUDENT IN AN ORGANIZED HEALTH CARE EDUCATION/TRAINING PROGRAM

## 2022-12-21 PROCEDURE — 81002 URINALYSIS NONAUTO W/O SCOPE: CPT | Performed by: STUDENT IN AN ORGANIZED HEALTH CARE EDUCATION/TRAINING PROGRAM

## 2022-12-29 ENCOUNTER — ROUTINE PRENATAL (OUTPATIENT)
Dept: OBGYN CLINIC | Age: 27
End: 2022-12-29
Payer: COMMERCIAL

## 2022-12-29 VITALS — DIASTOLIC BLOOD PRESSURE: 68 MMHG | WEIGHT: 200 LBS | BODY MASS INDEX: 36.58 KG/M2 | SYSTOLIC BLOOD PRESSURE: 104 MMHG

## 2022-12-29 DIAGNOSIS — Z13.89 SCREENING FOR GENITOURINARY CONDITION: ICD-10-CM

## 2022-12-29 DIAGNOSIS — Z34.03 ENCOUNTER FOR SUPERVISION OF NORMAL FIRST PREGNANCY IN THIRD TRIMESTER: Primary | ICD-10-CM

## 2022-12-29 DIAGNOSIS — Z3A.37 37 WEEKS GESTATION OF PREGNANCY: ICD-10-CM

## 2022-12-29 LAB
GLUCOSE URINE, POC: NORMAL
PROTEIN,URINE, POC: NEGATIVE

## 2022-12-29 PROCEDURE — 99902 PR PRENATAL VISIT: CPT | Performed by: OBSTETRICS & GYNECOLOGY

## 2022-12-29 PROCEDURE — 81002 URINALYSIS NONAUTO W/O SCOPE: CPT | Performed by: OBSTETRICS & GYNECOLOGY

## 2023-01-04 ENCOUNTER — ROUTINE PRENATAL (OUTPATIENT)
Dept: OBGYN CLINIC | Age: 28
End: 2023-01-04
Payer: COMMERCIAL

## 2023-01-04 VITALS
SYSTOLIC BLOOD PRESSURE: 102 MMHG | WEIGHT: 201.2 LBS | BODY MASS INDEX: 37.02 KG/M2 | DIASTOLIC BLOOD PRESSURE: 74 MMHG | HEIGHT: 62 IN

## 2023-01-04 DIAGNOSIS — Z13.89 SCREENING FOR GENITOURINARY CONDITION: ICD-10-CM

## 2023-01-04 DIAGNOSIS — Z3A.37 37 WEEKS GESTATION OF PREGNANCY: ICD-10-CM

## 2023-01-04 DIAGNOSIS — Z36.85 ANTENATAL SCREENING FOR STREPTOCOCCUS B: ICD-10-CM

## 2023-01-04 DIAGNOSIS — Z34.03 ENCOUNTER FOR SUPERVISION OF NORMAL FIRST PREGNANCY IN THIRD TRIMESTER: Primary | ICD-10-CM

## 2023-01-04 LAB
GLUCOSE URINE, POC: NORMAL
PROTEIN,URINE, POC: NEGATIVE

## 2023-01-04 PROCEDURE — 81002 URINALYSIS NONAUTO W/O SCOPE: CPT | Performed by: OBSTETRICS & GYNECOLOGY

## 2023-01-04 PROCEDURE — 99902 PR PRENATAL VISIT: CPT | Performed by: OBSTETRICS & GYNECOLOGY

## 2023-01-08 LAB
BACTERIA SPEC CULT: NORMAL
SERVICE CMNT-IMP: NORMAL

## 2023-01-12 ENCOUNTER — TELEPHONE (OUTPATIENT)
Dept: OBGYN CLINIC | Age: 28
End: 2023-01-12

## 2023-01-12 ENCOUNTER — HOSPITAL ENCOUNTER (INPATIENT)
Age: 28
LOS: 2 days | Discharge: HOME | End: 2023-01-14
Attending: OBSTETRICS & GYNECOLOGY | Admitting: OBSTETRICS & GYNECOLOGY
Payer: COMMERCIAL

## 2023-01-12 ENCOUNTER — ANESTHESIA (OUTPATIENT)
Dept: LABOR AND DELIVERY | Age: 28
End: 2023-01-12
Payer: COMMERCIAL

## 2023-01-12 ENCOUNTER — ANESTHESIA EVENT (OUTPATIENT)
Dept: LABOR AND DELIVERY | Age: 28
End: 2023-01-12
Payer: COMMERCIAL

## 2023-01-12 DIAGNOSIS — Z37.9 NORMAL LABOR: Primary | ICD-10-CM

## 2023-01-12 LAB
ABO + RH BLD: NORMAL
BLOOD GROUP ANTIBODIES SERPL: NORMAL
ERYTHROCYTE [DISTWIDTH] IN BLOOD BY AUTOMATED COUNT: 13.2 % (ref 11.9–14.6)
HCT VFR BLD AUTO: 38.2 % (ref 35.8–46.3)
HGB BLD-MCNC: 12.8 G/DL (ref 11.7–15.4)
MCH RBC QN AUTO: 27.2 PG (ref 26.1–32.9)
MCHC RBC AUTO-ENTMCNC: 33.5 G/DL (ref 31.4–35)
MCV RBC AUTO: 81.1 FL (ref 82–102)
NRBC # BLD: 0 K/UL (ref 0–0.2)
PLATELET # BLD AUTO: 250 K/UL (ref 150–450)
PMV BLD AUTO: 9.6 FL (ref 9.4–12.3)
RBC # BLD AUTO: 4.71 M/UL (ref 4.05–5.2)
SPECIMEN EXP DATE BLD: NORMAL
WBC # BLD AUTO: 13.6 K/UL (ref 4.3–11.1)

## 2023-01-12 PROCEDURE — 6360000002 HC RX W HCPCS: Performed by: OBSTETRICS & GYNECOLOGY

## 2023-01-12 PROCEDURE — 51701 INSERT BLADDER CATHETER: CPT

## 2023-01-12 PROCEDURE — 59025 FETAL NON-STRESS TEST: CPT

## 2023-01-12 PROCEDURE — 99284 EMERGENCY DEPT VISIT MOD MDM: CPT

## 2023-01-12 PROCEDURE — 10H07YZ INSERTION OF OTHER DEVICE INTO PRODUCTS OF CONCEPTION, VIA NATURAL OR ARTIFICIAL OPENING: ICD-10-PCS | Performed by: OBSTETRICS & GYNECOLOGY

## 2023-01-12 PROCEDURE — 85027 COMPLETE CBC AUTOMATED: CPT

## 2023-01-12 PROCEDURE — 3700000025 EPIDURAL BLOCK: Performed by: ANESTHESIOLOGY

## 2023-01-12 PROCEDURE — 1100000000 HC RM PRIVATE

## 2023-01-12 PROCEDURE — 2500000003 HC RX 250 WO HCPCS: Performed by: ANESTHESIOLOGY

## 2023-01-12 PROCEDURE — 36415 COLL VENOUS BLD VENIPUNCTURE: CPT

## 2023-01-12 PROCEDURE — 6360000002 HC RX W HCPCS: Performed by: NURSE ANESTHETIST, CERTIFIED REGISTERED

## 2023-01-12 PROCEDURE — 86901 BLOOD TYPING SEROLOGIC RH(D): CPT

## 2023-01-12 PROCEDURE — 00HU33Z INSERTION OF INFUSION DEVICE INTO SPINAL CANAL, PERCUTANEOUS APPROACH: ICD-10-PCS | Performed by: ANESTHESIOLOGY

## 2023-01-12 RX ORDER — LIDOCAINE HYDROCHLORIDE 10 MG/ML
1 INJECTION, SOLUTION INFILTRATION; PERINEURAL
Status: DISCONTINUED | OUTPATIENT
Start: 2023-01-12 | End: 2023-01-13 | Stop reason: HOSPADM

## 2023-01-12 RX ORDER — LIDOCAINE HYDROCHLORIDE AND EPINEPHRINE 15; 5 MG/ML; UG/ML
INJECTION, SOLUTION EPIDURAL PRN
Status: DISCONTINUED | OUTPATIENT
Start: 2023-01-12 | End: 2023-01-13 | Stop reason: SDUPTHER

## 2023-01-12 RX ORDER — SODIUM CHLORIDE 9 MG/ML
25 INJECTION, SOLUTION INTRAVENOUS PRN
Status: DISCONTINUED | OUTPATIENT
Start: 2023-01-12 | End: 2023-01-13 | Stop reason: HOSPADM

## 2023-01-12 RX ORDER — SODIUM CHLORIDE 0.9 % (FLUSH) 0.9 %
5-40 SYRINGE (ML) INJECTION EVERY 12 HOURS SCHEDULED
Status: DISCONTINUED | OUTPATIENT
Start: 2023-01-12 | End: 2023-01-13 | Stop reason: HOSPADM

## 2023-01-12 RX ORDER — SODIUM CHLORIDE, SODIUM LACTATE, POTASSIUM CHLORIDE, CALCIUM CHLORIDE 600; 310; 30; 20 MG/100ML; MG/100ML; MG/100ML; MG/100ML
INJECTION, SOLUTION INTRAVENOUS CONTINUOUS
Status: DISCONTINUED | OUTPATIENT
Start: 2023-01-12 | End: 2023-01-13 | Stop reason: HOSPADM

## 2023-01-12 RX ORDER — ROPIVACAINE HYDROCHLORIDE 2 MG/ML
INJECTION, SOLUTION EPIDURAL; INFILTRATION; PERINEURAL CONTINUOUS PRN
Status: DISCONTINUED | OUTPATIENT
Start: 2023-01-12 | End: 2023-01-13 | Stop reason: SDUPTHER

## 2023-01-12 RX ORDER — ONDANSETRON 2 MG/ML
4 INJECTION INTRAMUSCULAR; INTRAVENOUS EVERY 6 HOURS PRN
Status: DISCONTINUED | OUTPATIENT
Start: 2023-01-12 | End: 2023-01-13 | Stop reason: HOSPADM

## 2023-01-12 RX ORDER — SODIUM CHLORIDE 0.9 % (FLUSH) 0.9 %
5-40 SYRINGE (ML) INJECTION PRN
Status: DISCONTINUED | OUTPATIENT
Start: 2023-01-12 | End: 2023-01-13 | Stop reason: HOSPADM

## 2023-01-12 RX ORDER — ACETAMINOPHEN 325 MG/1
650 TABLET ORAL EVERY 4 HOURS PRN
Status: DISCONTINUED | OUTPATIENT
Start: 2023-01-12 | End: 2023-01-13 | Stop reason: HOSPADM

## 2023-01-12 RX ORDER — NALOXONE HYDROCHLORIDE 0.4 MG/ML
INJECTION, SOLUTION INTRAMUSCULAR; INTRAVENOUS; SUBCUTANEOUS PRN
Status: DISCONTINUED | OUTPATIENT
Start: 2023-01-12 | End: 2023-01-13 | Stop reason: HOSPADM

## 2023-01-12 RX ORDER — SODIUM CHLORIDE, SODIUM LACTATE, POTASSIUM CHLORIDE, AND CALCIUM CHLORIDE .6; .31; .03; .02 G/100ML; G/100ML; G/100ML; G/100ML
500 INJECTION, SOLUTION INTRAVENOUS PRN
Status: DISCONTINUED | OUTPATIENT
Start: 2023-01-12 | End: 2023-01-13 | Stop reason: HOSPADM

## 2023-01-12 RX ORDER — SODIUM CHLORIDE 9 MG/ML
INJECTION, SOLUTION INTRAVENOUS PRN
Status: DISCONTINUED | OUTPATIENT
Start: 2023-01-12 | End: 2023-01-13 | Stop reason: HOSPADM

## 2023-01-12 RX ORDER — TRANEXAMIC ACID 10 MG/ML
1000 INJECTION, SOLUTION INTRAVENOUS
Status: DISCONTINUED | OUTPATIENT
Start: 2023-01-12 | End: 2023-01-13 | Stop reason: HOSPADM

## 2023-01-12 RX ORDER — SODIUM CHLORIDE, SODIUM LACTATE, POTASSIUM CHLORIDE, AND CALCIUM CHLORIDE .6; .31; .03; .02 G/100ML; G/100ML; G/100ML; G/100ML
1000 INJECTION, SOLUTION INTRAVENOUS PRN
Status: DISCONTINUED | OUTPATIENT
Start: 2023-01-12 | End: 2023-01-13 | Stop reason: HOSPADM

## 2023-01-12 RX ORDER — FENTANYL CITRATE 50 UG/ML
INJECTION, SOLUTION INTRAMUSCULAR; INTRAVENOUS PRN
Status: DISCONTINUED | OUTPATIENT
Start: 2023-01-12 | End: 2023-01-13 | Stop reason: SDUPTHER

## 2023-01-12 RX ADMIN — LIDOCAINE HYDROCHLORIDE,EPINEPHRINE BITARTRATE 3 ML: 15; .005 INJECTION, SOLUTION EPIDURAL; INFILTRATION; INTRACAUDAL; PERINEURAL at 16:38

## 2023-01-12 RX ADMIN — FENTANYL CITRATE 100 MCG: 50 INJECTION, SOLUTION INTRAMUSCULAR; INTRAVENOUS at 16:40

## 2023-01-12 RX ADMIN — Medication 1 MILLI-UNITS/MIN: at 11:20

## 2023-01-12 RX ADMIN — Medication 8 MILLI-UNITS/MIN: at 14:26

## 2023-01-12 RX ADMIN — ROPIVACAINE HYDROCHLORIDE 8 ML/HR: 2 INJECTION, SOLUTION EPIDURAL; INFILTRATION; PERINEURAL at 16:44

## 2023-01-12 RX ADMIN — BUTORPHANOL TARTRATE 1 MG: 2 INJECTION, SOLUTION INTRAMUSCULAR; INTRAVENOUS at 16:21

## 2023-01-12 RX ADMIN — LIDOCAINE HYDROCHLORIDE,EPINEPHRINE BITARTRATE 2 ML: 15; .005 INJECTION, SOLUTION EPIDURAL; INFILTRATION; INTRACAUDAL; PERINEURAL at 16:40

## 2023-01-12 ASSESSMENT — PAIN SCALES - GENERAL: PAINLEVEL_OUTOF10: 6

## 2023-01-12 ASSESSMENT — PAIN DESCRIPTION - LOCATION: LOCATION: ABDOMEN;BACK

## 2023-01-12 ASSESSMENT — PAIN DESCRIPTION - DESCRIPTORS: DESCRIPTORS: CRAMPING

## 2023-01-12 ASSESSMENT — PAIN DESCRIPTION - ORIENTATION: ORIENTATION: LOWER

## 2023-01-12 NOTE — PROGRESS NOTES
Pt presents to [facility] ED with c/o SROM. Pt grossly ruptured. Dr. Chris Miramontes made aware. Triage assessment as documented. SVE per MD. Pt admitted to 433 for labor.

## 2023-01-12 NOTE — H&P
Obstetrics History & Physical    Name: Pattie Cotton  Date: 2023 10:19 AM  MRN#: [id number]  : 1995  Age/Sex: 28 y. o.female  Admission Date: 2023  Admitting Provider: Jose Pearson MD    HPI: Pattie Cotton is a 28 y.o.  female with Estimated Date of Delivery: 23 at 39w0d gestation who presents for possible spontaneous rupture of membranes. Her current obstetrical history is significant for uncomplicated pregnancy. Prenatal records reviewed. Gross rupture of membranes at 4:45 this morning. She reports good fetal movement. She denies vaginal bleeding. She denies regular contractions. Past History:  Obstetrics History:  OB History    Para Term  AB Living   1   0 0 0 0   SAB IAB Ectopic Molar Multiple Live Births                    # Outcome Date GA Lbr Guy/2nd Weight Sex Delivery Anes PTL Lv   1 Current                Medical History:  Past Medical History:   Diagnosis Date    COVID      Past Surgical History:   Procedure Laterality Date    WISDOM TOOTH EXTRACTION       Social History     Tobacco Use    Smoking status: Never    Smokeless tobacco: Never   Substance Use Topics    Alcohol use: Not Currently     Family History   Problem Relation Age of Onset    Heart Disease Mother     Heart Failure Father         heart transplant    Heart Attack Mother 36     Prior to Admission medications    Medication Sig Start Date End Date Taking?  Authorizing Provider   Hydrocort-Pramoxine, Perianal, (ANALPRAM HC) 2.5-1 % rectal cream Place rectally 3 times daily 12/15/22   Osorio Vang MD   Ferrous Sulfate (SLOW FE PO) Take by mouth    Historical Provider, MD   esomeprazole (NEXIUM) 40 MG delayed release capsule Take 1 capsule by mouth daily 22   Ally Frias MD   Ascorbic Acid (VITAMIN C PO) Take by mouth    Historical Provider, MD   Cholecalciferol (VITAMIN D3) 50 MCG (2000) CAPS Take by mouth    Historical Provider, MD Steele w/o B-LS-Grevnuz-FA-DHA (PNV-DHA PO) Take by mouth    Historical Provider, MD       Current Facility-Administered Medications:     lactated ringers infusion, , IntraVENous, Continuous, Isa Brush MD    lactated ringers bolus, 500 mL, IntraVENous, PRN **OR** lactated ringers bolus, 1,000 mL, IntraVENous, PRN, Isa Brush MD    sodium chloride flush 0.9 % injection 5-40 mL, 5-40 mL, IntraVENous, 2 times per day, Isa Brush MD    sodium chloride flush 0.9 % injection 5-40 mL, 5-40 mL, IntraVENous, PRN, Isa Brush MD    0.9 % sodium chloride infusion, 25 mL, IntraVENous, PRN, Isa Brush MD    butorphanol (STADOL) injection 1 mg, 1 mg, IntraVENous, Q3H PRN, Isa Brush MD    ondansetron (ZOFRAN) injection 4 mg, 4 mg, IntraVENous, Q6H PRN, Isa Brush MD    tranexamic acid-NaCl IVPB premix 1,000 mg, 1,000 mg, IntraVENous, Once PRN, Isa Brush MD    acetaminophen (TYLENOL) tablet 650 mg, 650 mg, Oral, Q4H PRN, Isa Brush MD    benzocaine-menthol (DERMOPLAST) 20-0.5 % spray, , Topical, PRN, Isa Brush MD  No Known Allergies    Physical Exam:  VITALS:  BP (!) 140/94   Pulse (!) 114   Temp 98 °F (36.7 °C) (Oral)   Resp 18   Ht 5' 2\" (1.575 m)   Wt 201 lb (91.2 kg)   LMP 04/14/2022   SpO2 98%   BMI 36.76 kg/m²     CONSTITUTIONAL:  awake, alert, cooperative, no apparent distress, and appears stated age  FHTs: Reactive and reassuring and Category I strip  Membranes: spontaneously ruptured fluid is copious and AmniSure positive  Cervix: 3-4 cm dilated, 70% effaced, -3 station  Uterine activity/Contractions on monitor: Regular, every 3 minutes (though she doesn't feel them much)  Presentation: cephalic    Labs: Review & Summary  Prenatal:  Lab Results   Component Value Date/Time    HGB 11.5 (L) 10/27/2022 08:50 AM    HCT 33.1 (L) 09/29/2022 09:55 AM     GBS negative      Assessment:  39w0d gestation  SROM; Early labor  Obstetrical history significant for uncomplicated pregnancy.   Reassuring fetal status    Plan: admit for labor    Discussed with Dr. Kate Treviño

## 2023-01-12 NOTE — PROGRESS NOTES
Diana Juarez at bedside at 21 . LASHAY Medina at bedside at [address] 3599 pt to sitting up on bedside at 1625. Timeout completed at [phone] with MD, LASHAY and myself at bedside. Test dose given at 1638. Negative reaction. Dose given at [phone]. Pt assisted to lying back in left tilt position. See anesthesia record for details. See vital sign flow sheet for BP. Tolerated procedure well.

## 2023-01-12 NOTE — ANESTHESIA PROCEDURE NOTES
Epidural Block    Patient location during procedure: OB  Reason for block: labor epidural  Staffing  Performed: anesthesiologist   Anesthesiologist: oTbi Ríos MD  Epidural  Patient position: sitting  Prep: ChloraPrep  Patient monitoring: continuous pulse ox and frequent blood pressure checks  Approach: midline  Location: L3-4  Injection technique: HERMES air  Provider prep: mask and sterile gloves  Needle  Needle type: Tuohy   Needle gauge: 17 G  Epidural needle length (in): 10 cm. Needle insertion depth: 7 cm  Catheter type: end hole  Catheter size: 19 G  Catheter at skin depth: 12 cm  Test dose: negativeCatheter Secured: tegaderm and tape (liquid adhesive)  Assessment  Hemodynamics: stable  Attempts: 1  Outcomes: patient tolerated procedure well and uncomplicated  Additional Notes  3 cc 1% lidocaine local at needle insertion site. Risks discussed including damage to muscle or nerve.   Preanesthetic Checklist  Completed: patient identified, IV checked, risks and benefits discussed, equipment checked, pre-op evaluation, timeout performed, anesthesia consent given, oxygen available and monitors applied/VS acknowledged

## 2023-01-12 NOTE — TELEPHONE ENCOUNTER
Pt calls states that she is pretty sure her water broke. She is 39 weeks IUP today. She states that she woke up around 0445 this morning and her pajamas were wet. She states that she has continued to leak fluid. She is not having contractions. She is advised to go to L&D at [facility] for evaluation. All questions answered. Voiced full understanding.

## 2023-01-12 NOTE — ANESTHESIA PRE PROCEDURE
Department of Anesthesiology  Preprocedure Note       Name:  Yoel Keenan   Age:  28 y.o.  :  1995                                          MRN:  [id number]         Date:  2023      Surgeon: * No surgeons listed *    Procedure: * No procedures listed *    Medications prior to admission:   Prior to Admission medications    Medication Sig Start Date End Date Taking?  Authorizing Provider   Hydrocort-Pramoxine, Perianal, (ANALPRAM HC) 2.5-1 % rectal cream Place rectally 3 times daily 12/15/22   Susan Guillen MD   Ferrous Sulfate (SLOW FE PO) Take by mouth    Historical Provider, MD   esomeprazole (NEXIUM) 40 MG delayed release capsule Take 1 capsule by mouth daily 22   Roslyn Islas MD   Ascorbic Acid (VITAMIN C PO) Take by mouth    Historical Provider, MD   Cholecalciferol (VITAMIN D3) 50 MCG (2000) CAPS Take by mouth    Historical Provider, MD   Prenat w/o G-QC-Wtabzgd-FA-DHA (PNV-DHA PO) Take by mouth    Historical Provider, MD       Current medications:    Current Facility-Administered Medications   Medication Dose Route Frequency Provider Last Rate Last Admin    lactated ringers infusion   IntraVENous Continuous Vonda Matson MD        lactated ringers bolus  500 mL IntraVENous PRN Vonda Matson MD        Or    lactated ringers bolus  1,000 mL IntraVENous PRN Vonda Matson MD        sodium chloride flush 0.9 % injection 5-40 mL  5-40 mL IntraVENous 2 times per day Vonda Maston MD        sodium chloride flush 0.9 % injection 5-40 mL  5-40 mL IntraVENous PRN Vonda Matson MD        0.9 % sodium chloride infusion  25 mL IntraVENous PRN Vonda Matson MD        butorphanol (STADOL) injection 1 mg  1 mg IntraVENous Q3H PRN Vonda Matson MD   1 mg at 23 1621    ondansetron (ZOFRAN) injection 4 mg  4 mg IntraVENous Q6H PRN Vonda Matson MD        tranexamic acid-NaCl IVPB premix 1,000 mg  1,000 mg IntraVENous Once PRN Vonda Matson MD        acetaminophen (TYLENOL) tablet 650 mg  650 mg Oral Q4H PRN Deena Barr MD        benzocaine-menthol (DERMOPLAST) 20-0.5 % spray   Topical PRN Deena Barr MD        oxytocin (PITOCIN) 30 units in 500 mL infusion  1-20 victorino-units/min IntraVENous Continuous Irma Morton MD 10 mL/hr at 01/12/23 1524 10 victorino-units/min at 01/12/23 1524    lidocaine 1 % injection 1 mL  1 mL IntraDERmal Once PRN Shey Mcknight MD        lactated ringers infusion   IntraVENous Continuous Shey Mcknight MD        sodium chloride flush 0.9 % injection 5-40 mL  5-40 mL IntraVENous 2 times per day Shey Mcknight MD        sodium chloride flush 0.9 % injection 5-40 mL  5-40 mL IntraVENous PRN Shey Mcknight MD        0.9 % sodium chloride infusion   IntraVENous PRN Shey Mcknight MD        naloxone 0.4 mg in 10 mL sodium chloride syringe   IntraVENous PRN Shey Mcknight MD         Facility-Administered Medications Ordered in Other Encounters   Medication Dose Route Frequency Provider Last Rate Last Admin    fentaNYL (SUBLIMAZE) injection   Epidural PRN JULIETTE Cohen - CRNA   100 mcg at 01/12/23 1640    ropivacaine (NAROPIN) 0.2% injection 0.2%   Epidural Continuous PRN JULIETTE Cohen - CRNA 8 mL/hr at 01/12/23 1644 8 mL/hr at 01/12/23 1644       Allergies:  No Known Allergies    Problem List:    Patient Active Problem List   Diagnosis Code    Pregnancy Z34.90    COVID-19 vaccine series completed Z92.29    Family history of heart failure Z82.49    Family history of Down syndrome Z82.79    COVID-19 affecting pregnancy in first trimester O98.511, U5.3    Anemia affecting pregnancy in second trimester O99.012    Normal labor O80, Z37.9       Past Medical History:        Diagnosis Date    COVID        Past Surgical History:        Procedure Laterality Date    WISDOM TOOTH EXTRACTION         Social History:    Social History     Tobacco Use    Smoking status: Never    Smokeless tobacco: Never Substance Use Topics    Alcohol use: Not Currently                                Counseling given: Not Answered      Vital Signs (Current):   Vitals:    01/12/23 1421 01/12/23 1638 01/12/23 1639 01/12/23 1646   BP: 127/76 (!) 140/89 (!) 144/90 136/67   Pulse: (!) 106 (!) 101 (!) 108 (!) 103   Resp:       Temp:       TempSrc:       SpO2:       Weight:       Height:                                                  BP Readings from Last 3 Encounters:   01/12/23 136/67   01/04/23 102/74   12/29/22 104/68       NPO Status:                                                                                 BMI:   Wt Readings from Last 3 Encounters:   01/12/23 201 lb (91.2 kg)   01/04/23 201 lb 3.2 oz (91.3 kg)   12/29/22 200 lb (90.7 kg)     Body mass index is 36.76 kg/m². CBC:   Lab Results   Component Value Date/Time    WBC 13.6 01/12/2023 11:11 AM    RBC 4.71 01/12/2023 11:11 AM    HGB 12.8 01/12/2023 11:11 AM    HCT 38.2 01/12/2023 11:11 AM    MCV 81.1 01/12/2023 11:11 AM    RDW 13.2 01/12/2023 11:11 AM     01/12/2023 11:11 AM       CMP: No results found for: NA, K, CL, CO2, BUN, CREATININE, GFRAA, AGRATIO, LABGLOM, GLUCOSE, GLU, PROT, CALCIUM, BILITOT, ALKPHOS, AST, ALT    POC Tests: No results for input(s): POCGLU, POCNA, POCK, POCCL, POCBUN, POCHEMO, POCHCT in the last 72 hours.     Coags: No results found for: PROTIME, INR, APTT    HCG (If Applicable): No results found for: PREGTESTUR, PREGSERUM, HCG, HCGQUANT     ABGs: No results found for: PHART, PO2ART, YAK2BOH, UHO1GNQ, BEART, G2TQOTOG     Type & Screen (If Applicable):  No results found for: LABABO, LABRH    Drug/Infectious Status (If Applicable):  Lab Results   Component Value Date/Time    HEPCAB NONREACTIVE 06/17/2022 04:21 PM       COVID-19 Screening (If Applicable): No results found for: COVID19        Anesthesia Evaluation    Airway: Mallampati: II  TM distance: >3 FB   Neck ROM: full  Mouth opening: > = 3 FB   Dental: normal exam Pulmonary:normal exam  breath sounds clear to auscultation                             Cardiovascular:  Exercise tolerance: good (>4 METS),           Rhythm: regular  Rate: normal                    Neuro/Psych:               GI/Hepatic/Renal:             Endo/Other:    (+) blood dyscrasia: anemia:., .                 Abdominal:             Vascular: Other Findings:           Anesthesia Plan      epidural     ASA 2             Anesthetic plan and risks discussed with patient and spouse.                         Isabella Paez MD   1/12/2023

## 2023-01-12 NOTE — PROGRESS NOTES
Pt sitting on ball comfortable without evidence of pain  Will not check again until has more pain  TOCO irregular  Pit at 8  Baseline Rate: 130 bpm  Baseline Classification: Normal  Variability: Moderate  Pattern: Accelerations  OB Bladder Status: Voiding  OB Bladder Intervention: Voiding with Relief  Multiple birth?: No  Contraction Frequency: 2-4  Contraction Duration: 40-70  Contraction Intensity: Mild

## 2023-01-12 NOTE — PROGRESS NOTES
5/75 posterior and high elevated head with return of clear fluid  Baseline Rate: 140 bpm  Baseline Classification: Normal  Variability: Moderate  Pattern: Accelerations  OB Bladder Status: Voiding  OB Bladder Intervention: Voiding with Relief  Multiple birth?: No  Contraction Frequency: 2-3  Contraction Duration: 40-70  Contraction Intensity: Mild    Continue to augment with pitocin  Discussed station and need for head to come down  All ? S answered

## 2023-01-12 NOTE — PROGRESS NOTES
Labor check  5/90/-3 no pressure on cervix from head  IUPC placed  Monitor for MVU >= 220  Discussed with pt and FOB  All ?s answered  Baseline Rate: 140 bpm  Baseline Classification: Normal  Variability: Moderate  Pattern: Accelerations  OB Bladder Status: Voiding  OB Bladder Intervention: Voiding with Relief  Multiple birth?: No  Contraction Frequency: 2-3  Contraction Duration: 40-70  Contraction Intensity: Mild

## 2023-01-13 LAB
BASE DEFICIT BLD-SCNC: 12.9 MMOL/L
BASE DEFICIT BLD-SCNC: 8.1 MMOL/L
HCO3 BLD-SCNC: 17.7 MMOL/L (ref 22–26)
HCO3 BLDV-SCNC: 19.2 MMOL/L (ref 23–28)
PCO2 BLD: 58.7 MMHG (ref 35–45)
PCO2 BLDCO: 45 MMHG (ref 32–68)
PH BLD: 7.09 (ref 7.35–7.45)
PH BLDCO: 7.24 (ref 7.15–7.38)
PO2 BLD: 25 MMHG (ref 75–100)
PO2 BLDCO: 29 MMHG
SAO2 % BLD: 27.3 % (ref 95–98)
SAO2 % BLDV: 45.4 % (ref 65–88)
SERVICE CMNT-IMP: ABNORMAL
SERVICE CMNT-IMP: ABNORMAL
SPECIMEN TYPE: ABNORMAL
SPECIMEN TYPE: ABNORMAL

## 2023-01-13 PROCEDURE — 82803 BLOOD GASES ANY COMBINATION: CPT

## 2023-01-13 PROCEDURE — 0KQM0ZZ REPAIR PERINEUM MUSCLE, OPEN APPROACH: ICD-10-PCS | Performed by: OBSTETRICS & GYNECOLOGY

## 2023-01-13 PROCEDURE — 6360000002 HC RX W HCPCS: Performed by: OBSTETRICS & GYNECOLOGY

## 2023-01-13 PROCEDURE — 7210000100 HC LABOR FEE PER 1 HR

## 2023-01-13 PROCEDURE — 36600 WITHDRAWAL OF ARTERIAL BLOOD: CPT

## 2023-01-13 PROCEDURE — 7220000101 HC DELIVERY VAGINAL/SINGLE

## 2023-01-13 PROCEDURE — 7100000011 HC PHASE II RECOVERY - ADDTL 15 MIN

## 2023-01-13 PROCEDURE — 51701 INSERT BLADDER CATHETER: CPT

## 2023-01-13 PROCEDURE — 6370000000 HC RX 637 (ALT 250 FOR IP): Performed by: OBSTETRICS & GYNECOLOGY

## 2023-01-13 PROCEDURE — 1100000000 HC RM PRIVATE

## 2023-01-13 PROCEDURE — 59400 OBSTETRICAL CARE: CPT | Performed by: OBSTETRICS & GYNECOLOGY

## 2023-01-13 PROCEDURE — 7100000010 HC PHASE II RECOVERY - FIRST 15 MIN

## 2023-01-13 RX ORDER — SODIUM CHLORIDE 0.9 % (FLUSH) 0.9 %
5-40 SYRINGE (ML) INJECTION PRN
Status: DISCONTINUED | OUTPATIENT
Start: 2023-01-13 | End: 2023-01-14

## 2023-01-13 RX ORDER — IBUPROFEN 800 MG/1
800 TABLET ORAL EVERY 8 HOURS
Status: DISCONTINUED | OUTPATIENT
Start: 2023-01-13 | End: 2023-01-14 | Stop reason: HOSPADM

## 2023-01-13 RX ORDER — ACETAMINOPHEN 500 MG
1000 TABLET ORAL EVERY 8 HOURS PRN
Status: DISCONTINUED | OUTPATIENT
Start: 2023-01-13 | End: 2023-01-14 | Stop reason: HOSPADM

## 2023-01-13 RX ORDER — SODIUM CHLORIDE 0.9 % (FLUSH) 0.9 %
5-40 SYRINGE (ML) INJECTION EVERY 12 HOURS SCHEDULED
Status: DISCONTINUED | OUTPATIENT
Start: 2023-01-13 | End: 2023-01-14

## 2023-01-13 RX ORDER — OXYCODONE HYDROCHLORIDE 5 MG/1
5 TABLET ORAL EVERY 4 HOURS PRN
Status: DISCONTINUED | OUTPATIENT
Start: 2023-01-13 | End: 2023-01-14 | Stop reason: HOSPADM

## 2023-01-13 RX ORDER — SODIUM CHLORIDE 9 MG/ML
INJECTION, SOLUTION INTRAVENOUS PRN
Status: DISCONTINUED | OUTPATIENT
Start: 2023-01-13 | End: 2023-01-14

## 2023-01-13 RX ORDER — HYDROCORTISONE ACETATE PRAMOXINE HCL 2.5; 1 G/100G; G/100G
CREAM TOPICAL 3 TIMES DAILY
Status: DISCONTINUED | OUTPATIENT
Start: 2023-01-13 | End: 2023-01-14 | Stop reason: HOSPADM

## 2023-01-13 RX ORDER — DOCUSATE SODIUM 100 MG/1
100 CAPSULE, LIQUID FILLED ORAL 2 TIMES DAILY
Status: DISCONTINUED | OUTPATIENT
Start: 2023-01-13 | End: 2023-01-14 | Stop reason: HOSPADM

## 2023-01-13 RX ORDER — LANOLIN
CREAM (ML) TOPICAL PRN
Status: DISCONTINUED | OUTPATIENT
Start: 2023-01-13 | End: 2023-01-14 | Stop reason: HOSPADM

## 2023-01-13 RX ORDER — SODIUM CHLORIDE, SODIUM LACTATE, POTASSIUM CHLORIDE, CALCIUM CHLORIDE 600; 310; 30; 20 MG/100ML; MG/100ML; MG/100ML; MG/100ML
INJECTION, SOLUTION INTRAVENOUS CONTINUOUS
Status: DISCONTINUED | OUTPATIENT
Start: 2023-01-13 | End: 2023-01-14

## 2023-01-13 RX ORDER — ONDANSETRON 8 MG/1
8 TABLET, ORALLY DISINTEGRATING ORAL EVERY 8 HOURS PRN
Status: DISCONTINUED | OUTPATIENT
Start: 2023-01-13 | End: 2023-01-14 | Stop reason: HOSPADM

## 2023-01-13 RX ADMIN — Medication: at 03:16

## 2023-01-13 RX ADMIN — OXYCODONE 5 MG: 5 TABLET ORAL at 03:16

## 2023-01-13 RX ADMIN — WITCH HAZEL: 500 SOLUTION RECTAL; TOPICAL at 03:16

## 2023-01-13 RX ADMIN — DOCUSATE SODIUM 100 MG: 100 CAPSULE ORAL at 07:31

## 2023-01-13 RX ADMIN — ACETAMINOPHEN 1000 MG: 500 TABLET, FILM COATED ORAL at 06:44

## 2023-01-13 RX ADMIN — IBUPROFEN 800 MG: 800 TABLET, FILM COATED ORAL at 11:25

## 2023-01-13 RX ADMIN — Medication 166.7 ML: at 00:05

## 2023-01-13 RX ADMIN — Medication 87.3 MILLI-UNITS/MIN: at 00:16

## 2023-01-13 RX ADMIN — OXYCODONE 5 MG: 5 TABLET ORAL at 23:34

## 2023-01-13 RX ADMIN — IBUPROFEN 800 MG: 800 TABLET, FILM COATED ORAL at 20:46

## 2023-01-13 RX ADMIN — ACETAMINOPHEN 1000 MG: 500 TABLET, FILM COATED ORAL at 16:06

## 2023-01-13 RX ADMIN — DOCUSATE SODIUM 100 MG: 100 CAPSULE ORAL at 20:46

## 2023-01-13 RX ADMIN — HYDROCORTISONE ACETATE PRAMOXINE HCL: 2.5; 1 CREAM TOPICAL at 22:37

## 2023-01-13 RX ADMIN — IBUPROFEN 800 MG: 800 TABLET, FILM COATED ORAL at 03:16

## 2023-01-13 RX ADMIN — OXYCODONE 5 MG: 5 TABLET ORAL at 07:29

## 2023-01-13 ASSESSMENT — PAIN DESCRIPTION - ORIENTATION
ORIENTATION: LOWER
ORIENTATION: LOWER
ORIENTATION: ANTERIOR;LOWER

## 2023-01-13 ASSESSMENT — PAIN SCALES - GENERAL
PAINLEVEL_OUTOF10: 4
PAINLEVEL_OUTOF10: 2
PAINLEVEL_OUTOF10: 3
PAINLEVEL_OUTOF10: 6
PAINLEVEL_OUTOF10: 3
PAINLEVEL_OUTOF10: 1
PAINLEVEL_OUTOF10: 6

## 2023-01-13 ASSESSMENT — PAIN DESCRIPTION - DESCRIPTORS
DESCRIPTORS: ACHING;CRAMPING
DESCRIPTORS: ACHING
DESCRIPTORS: ACHING;SORE
DESCRIPTORS: DISCOMFORT;ACHING;SORE
DESCRIPTORS: ACHING;SORE

## 2023-01-13 ASSESSMENT — PAIN DESCRIPTION - LOCATION
LOCATION: ABDOMEN;PERINEUM
LOCATION: PERINEUM;RECTUM
LOCATION: PERINEUM
LOCATION: PERINEUM;RECTUM
LOCATION: PERINEUM
LOCATION: PERINEUM

## 2023-01-13 ASSESSMENT — PAIN DESCRIPTION - PAIN TYPE: TYPE: ACUTE PAIN

## 2023-01-13 NOTE — ANESTHESIA POSTPROCEDURE EVALUATION
Department of Anesthesiology  Postprocedure Note    Patient: Glo Regan  MRN: [id number]  YOB: 1995  Date of evaluation: 1/13/2023      Procedure Summary     Date: 01/12/23 Room / Location:     Anesthesia Start: 1624 Anesthesia Stop: 01/13/23 0002    Procedure: Labor Analgesia Diagnosis:     Scheduled Providers:  Responsible Provider: Patricia Sanford MD    Anesthesia Type: epidural ASA Status: 2          Anesthesia Type: No value filed. Taran Phase I:      Taran Phase II:        Anesthesia Post Evaluation    Patient location during evaluation: PACU  Patient participation: complete - patient participated  Level of consciousness: awake and alert  Airway patency: patent  Nausea & Vomiting: no nausea and no vomiting  Complications: no  Cardiovascular status: hemodynamically stable  Respiratory status: acceptable, nonlabored ventilation and spontaneous ventilation  Hydration status: euvolemic  Comments: /78   Pulse 100   Temp 98.7 °F (37.1 °C) (Oral)   Resp 16   Ht 5' 2\" (1.575 m)   Wt 201 lb (91.2 kg)   LMP 04/14/2022   SpO2 95%   Breastfeeding Unknown   BMI 36.76 kg/m²   The patient was satisfied with her labor epidural and denies any complications. Her lower extremities have returned to baseline neurologically.     Multimodal analgesia pain management approach

## 2023-01-13 NOTE — PROGRESS NOTES
Post-Partum Day Number 0 Progress Note    Patient doing well post-partum without significant complaint. Voiding withour difficulty, normal lochia. Vitals:  Patient Vitals for the past 8 hrs:   BP Temp Temp src Pulse Resp SpO2   23 0444 124/78 98.7 °F (37.1 °C) Oral 100 16 95 %   23 0300 129/79 98.3 °F (36.8 °C) Oral (!) 110 17 96 %   23 0237 131/68 -- -- (!) 116 -- --   23 0205 124/75 -- -- (!) 121 -- --   23 0150 126/77 -- -- (!) 113 -- --   23 0135 124/75 -- -- (!) 116 -- --     Temp (24hrs), Av.6 °F (37 °C), Min:98 °F (36.7 °C), Max:99.8 °F (37.7 °C)      Vital signs stable, afebrile. Exam:  Patient without distress. Abdomen soft, fundus firm at level of umbilicus, nontender               Perineum with normal lochia noted. Lower extremities are + swelling, - cords or tenderness.     Labs:   Recent Results (from the past 24 hour(s))   CBC    Collection Time: 23 11:11 AM   Result Value Ref Range    WBC 13.6 (H) 4.3 - 11.1 K/uL    RBC 4.71 4.05 - 5.2 M/uL    Hemoglobin 12.8 11.7 - 15.4 g/dL    Hematocrit 38.2 35.8 - 46.3 %    MCV 81.1 (L) 82.0 - 102.0 FL    MCH 27.2 26.1 - 32.9 PG    MCHC 33.5 31.4 - 35.0 g/dL    RDW 13.2 11.9 - 14.6 %    Platelets 187 118 - 911 K/uL    MPV 9.6 9.4 - 12.3 FL    nRBC 0.00 0.0 - 0.2 K/uL   TYPE AND SCREEN    Collection Time: 23 11:11 AM   Result Value Ref Range    Crossmatch expiration date 01/15/2023,2359     ABO/Rh A POSITIVE     Antibody Screen NEG    Arterial Blood Gas, POC    Collection Time: 23 12:27 AM   Result Value Ref Range    pH, Arterial, POC 7.09 (LL) 7.35 - 7.45      pCO2, Arterial, POC 58.7 (H) 35 - 45 MMHG    pO2, Arterial, POC 25 (LL) 75 - 100 MMHG    HCO3, Mixed 17.7 (L) 22 - 26 MMOL/L    SO2c, Arterial, POC 27.3 (L) 95 - 98 %    BASE DEFICIT (POC) 12.9 mmol/L    Specimen type: ARTERIAL      Performed by: Vanessa    POCT Blood Gas, Cord Blood    Collection Time: 01/13/23 12:28 AM   Result Value Ref Range    ph, Cord Blood, POC 7.24 7. 15 - 7.38      PCO2, Cord Blood, POC 45 32 - 68 mmHg    PO2, Cord Blood, POC 29 mmHg    HCO3, Venous 19.2 (L) 23 - 28 MMOL/L    SO2, VENOUS (POC) 45.4 (L) 65 - 88 %    BASE DEFICIT (POC) 8.1 mmol/L    Specimen type: VENOUS CORD      Performed by: Vanessa        Assessment and Plan:  Patient appears to be having uncomplicated post-partum course. Continue routine perineal care and maternal education. Plan discharge tomorrow if no problems occur.

## 2023-01-13 NOTE — L&D DELIVERY NOTE
Mother's Information      Labor Events     Labor?: No  Cervical Ripening:   Now               [place] Hostella Girl Eddy Litten [[id number]]      Labor Events     Labor?: No  Cervical Ripening Date/Time:     Antibiotics Received during Labor?: No  Rupture Date/Time: 23 04:45:00   Rupture Type: SROM  Fluid Color: Clear  Fluid Odor: None  Induction: None  Augmentation: Oxytocin  Labor Complications: None       Anesthesia    Method: Epidural       Start Pushing      Labor onset date/time: 23 04:45:00 Now     Dilation complete date/time: 23 21:15:00 Now     Start pushing date/time: 2023 22:05:00   Decision date/time (emergent ):           Labor Length    1st stage: 16h 30m  2nd stage: 2h 47m  3rd stage: 0h 03m       Delivery ()      Delivery Date/Time:  23 00:02:00   Delivery Type: Vaginal, Spontaneous    Details:             Presentation    Presentation: Vertex  Position: Right  _: Occiput  _: Anterior       Shoulder Dystocia    Add Second Maneuver  Add Third Maneuver  Add Fourth Maneuver  Add Fifth Maneuver  Add Sixth Maneuver  Add Seventh Maneuver  Add Eighth Maneuver  Add Ninth Maneuver       Assisted Delivery Details    Forceps Attempted?: No  Vacuum Extractor Attempted?: No       Document Additional Attempt         Document Additional Attempt                 Cord    Vessels: 3 Vessels  Complications: None  Delayed Cord Clamping?: Yes  Cord Clamped Date/Time: 2023 00:04:00  Cord Blood Disposition: Lab  Gases Sent?: Yes       Placenta    Date/Time: 2023 00:05:00  Removal: Spontaneous  Appearance: Intact  Disposition: Discarded       Lacerations    Episiotomy: None  Perineal Lacerations: 2nd  Number of Repair Packets: 2       Vaginal Counts    Initial Count Personnel: [facility]  Initial Count Verified By: Sam Husain    Sponges Needles Instruments   Initial Counts Correct Correct    Final Counts      Final Count Personnel: [facility]  Final Count Verified By: Olga Montaño  If the count is incorrect due to Intentionally Retained Foreign Object (IRFO) add the IRFO LDA in Lines/Drains. Add LDA: Link to [facility]       Blood Loss  Mother: Laisha Corado #[id number]     Start of Mother's Information      Delivery Blood Loss  23 0445 - 23 0024      None                 End of Mother's Information  Mother: Laisha Corado #[id number]                Delivery Providers    Delivering clinician: Josey Joseph MD     Provider Role    Josey Joseph MD Obstetrician    Brenna Sotelo, LINDA Primary Nurse     Primary  Nurse    [address] [place] [address], RN Charge Nurse              Regina Assessment    Living Status: Living  Delivery Location Comment: 433     Apgar Scoring Key:    0 1 2    Skin Color: Blue or pale Acrocyanotic Completely pink    Heart Rate: Absent <100 bpm >100 bpm    Reflex Irritability: No response Grimace Cry or active withdrawal    Muscle Tone: Limp Some flexion Active motion    Respiratory Effort: Absent Weak cry; hypoventilation Good, crying                      Skin Color:   Heart Rate:   Reflex Irritability:   Muscle Tone:   Respiratory Effort:    Total:            1 Minute:    0    2    2    2    2    8        Apgar 1 total from OB History    5 Minute:    1    2    2    2    2    9        Apgar 5 total from OB History    10 Minute:              15 Minute:              20 Minute:                        Apgars Assigned ByBebe Scot              Resuscitation    Method: Bulb Suction, Stimulation             Regina Measurements      Birth Weight: 3300 g Birth Length: 0.52 m     Head Circumference: 0.335 m Chest Circumference: 0.33 m              Title      Skin to Skin Initiation Date/Time: 23 00:02:00 EST     Skin to Skin With: Mother     Skin to Skin End Date/Time:                  CTSP secondary to pushing, pushing with pt until crowned up , set up for delivery  in [facility] position over large/ long 2nd degree laceration,  baby with large amouut of stringy thick mucous on body, respiratory made aware to suction-  no shoulder dystocia. Infant to chest, delayed cord clamping cut by FOB, spontaneous delivery of placenta, vagina cervix inspected. Repair of 2nd in usual fashion with excellent hemostasis and cosmesis.  R-V exam normal

## 2023-01-13 NOTE — LACTATION NOTE

## 2023-01-13 NOTE — PROGRESS NOTES
Patient up to bathroom with minimal assistance. Sandhya-care taught and completed. Questions encouraged and answered. Patient ambulating without difficulty, encouraged to call for needs or concerns. Verbalizes understanding.

## 2023-01-13 NOTE — LACTATION NOTE
This note was copied from a baby's chart. Lactation visit. First time parents. Baby not yet 15 hours old. Has latched well x 2 so far and mom just fed baby on right breast, burping now. Assisted in football hold, left side. Mom with very round breasts. Reviewed breast compression and holding breast to help baby latch deeply. Baby latched on well. A bit on and off initially but then latched and stayed on well, fed about 10 minutes total on left side, had already fed x 15 minutes on right side. Has voided, no stool yet. Reviewed feeding expectations for first 24 hours of life. Feed on demand. Wake as needed. Watch output. All questions answered. Offered ongoing help today as needed.

## 2023-01-13 NOTE — PROGRESS NOTES
Rosette, RT from NICU at bedside to suction baby. See RT note for details. Cord Blood Gas results reviewed by Dr. Shorty Avina.

## 2023-01-14 VITALS
TEMPERATURE: 98.3 F | BODY MASS INDEX: 36.99 KG/M2 | HEART RATE: 116 BPM | DIASTOLIC BLOOD PRESSURE: 88 MMHG | HEIGHT: 62 IN | WEIGHT: 201 LBS | SYSTOLIC BLOOD PRESSURE: 134 MMHG | OXYGEN SATURATION: 95 % | RESPIRATION RATE: 24 BRPM

## 2023-01-14 PROBLEM — Z37.9 NORMAL LABOR: Status: RESOLVED | Noted: 2023-01-12 | Resolved: 2023-01-14

## 2023-01-14 PROCEDURE — 6370000000 HC RX 637 (ALT 250 FOR IP): Performed by: OBSTETRICS & GYNECOLOGY

## 2023-01-14 RX ORDER — OXYCODONE HYDROCHLORIDE 5 MG/1
5 TABLET ORAL EVERY 6 HOURS PRN
Qty: 15 TABLET | Refills: 0 | Status: SHIPPED | OUTPATIENT
Start: 2023-01-14 | End: 2023-01-19

## 2023-01-14 RX ORDER — IBUPROFEN 800 MG/1
800 TABLET ORAL EVERY 8 HOURS PRN
Qty: 40 TABLET | Refills: 1 | Status: SHIPPED | OUTPATIENT
Start: 2023-01-14

## 2023-01-14 RX ORDER — HYDROCORTISONE ACETATE PRAMOXINE HCL 2.5; 1 G/100G; G/100G
CREAM TOPICAL 3 TIMES DAILY
Qty: 30 G | Refills: 1 | Status: SHIPPED | OUTPATIENT
Start: 2023-01-14

## 2023-01-14 RX ADMIN — WITCH HAZEL: 500 SOLUTION RECTAL; TOPICAL at 10:22

## 2023-01-14 RX ADMIN — MAGNESIUM SULFATE 1 G: 1 CRYSTAL ORAL; TOPICAL at 08:00

## 2023-01-14 RX ADMIN — IBUPROFEN 800 MG: 800 TABLET, FILM COATED ORAL at 08:01

## 2023-01-14 RX ADMIN — Medication: at 10:21

## 2023-01-14 RX ADMIN — OXYCODONE 5 MG: 5 TABLET ORAL at 06:04

## 2023-01-14 RX ADMIN — HYDROCORTISONE ACETATE PRAMOXINE HCL: 2.5; 1 CREAM TOPICAL at 10:21

## 2023-01-14 RX ADMIN — DOCUSATE SODIUM 100 MG: 100 CAPSULE ORAL at 08:01

## 2023-01-14 ASSESSMENT — PAIN DESCRIPTION - ORIENTATION: ORIENTATION: LOWER

## 2023-01-14 ASSESSMENT — PAIN DESCRIPTION - DESCRIPTORS: DESCRIPTORS: ACHING;SORE

## 2023-01-14 ASSESSMENT — PAIN DESCRIPTION - LOCATION: LOCATION: RECTUM;PERINEUM

## 2023-01-14 ASSESSMENT — PAIN SCALES - GENERAL: PAINLEVEL_OUTOF10: 4

## 2023-01-14 NOTE — CARE COORDINATION
28 yr-old MF with infant girl.  provided education on [facility] [address] Visit and provided brochure. Patient declined referral at this time but will consider and call us if she changes her mind. Discussed and provided patient with  informational packet on  mood and anxiety disorders (resources/education). [id number]   Service Assessment   Patient Orientation Alert and Oriented   Cognition Alert   History Provided By Patient   Primary Caregiver Self   Support Systems Spouse/Significant Other   Patient's Healthcare Decision Maker is: Legal Next of Kin   PCP Verified by CM Yes   Last Visit to PCP Within last 3 months   Prior Functional Level Independent in ADLs/IADLs   Current Functional Level Independent in ADLs/IADLs   Can patient return to prior living arrangement Yes   Ability to make needs known: Good   Family able to assist with home care needs: Yes   Would you like for me to discuss the discharge plan with any other family members/significant others, and if so, who?  No   Financial Resources Other (Comment)   Community Resources Other (Comment)

## 2023-01-14 NOTE — LACTATION NOTE
This note was copied from a baby's chart. In to see mom and infant for discharge. Demo'd mom's personal pump per request. Reviewed discharge info and how to manage sore nipples and period of engorgement. Answered her questions on pumping and storage. Reviewed AAP recommendations for best time to introduce pacifier and bottle if possible. Tried baby at breast as well and reviewed tips for latching, however baby content sleeping at this time and not interested in feeding. Encouraged mom to try baby again at breast in 30 minutes - 1hr. Reviewed importance of 8-12 full feeds per day and monitor output. Encouraged mom to use her personal pump at home if baby not feeding well at breast at least 15 minutes, to pump both breasts 15 minute and give back any extra expressed breast milk to baby. No further needs at this time.

## 2023-01-14 NOTE — DISCHARGE INSTRUCTIONS
After Your Delivery (the Postpartum Period): Care Instructions  Overview     Congratulations on the birth of your baby. Like pregnancy, the  period can be a time of excitement, rachelle, and exhaustion. You may look at your wondrous little baby and feel happy. You may also be overwhelmed by your new sleep hours and new responsibilities. At first, babies often sleep during the days and are awake at night. They do not have a pattern or routine. They may make sudden gasps, jerk themselves awake, or look like they have crossed eyes. These are all normal, and they may even make you smile. In these first weeks after delivery, try to take good care of yourself. It may take 4 to 6 weeks to feel like yourself again, and possibly longer if you had a  birth. You will likely feel very tired for several weeks. Your days will be full of ups and downs, but lots of rachelle as well. Follow-up care is a key part of your treatment and safety. Be sure to make and go to all appointments, and call your doctor if you are having problems. It's also a good idea to know your test results and keep a list of the medicines you take. How can you care for yourself at home? Take care of your body after delivery  Use pads instead of tampons for the bloody flow that may last as long as 2 weeks. Ease cramps with ibuprofen (Advil, Motrin). Ease soreness of hemorrhoids and the area between your vagina and rectum with ice compresses or witch hazel pads. Ease constipation by drinking lots of fluid and eating high-fiber foods. Ask your doctor about over-the-counter stool softeners. Cleanse yourself with a gentle squeeze of warm water from a bottle instead of wiping with toilet paper. Take a sitz bath in warm water several times a day. Wear a good nursing bra. Ease sore and swollen breasts with warm, wet washcloths. If you aren't breastfeeding, use ice rather than heat for breast soreness.   Your period may not start for several months if you are breastfeeding. You may bleed more, and longer at first, than you did before you got pregnant. Wait until you are healed (about 4 to 6 weeks) before you have sex. Ask your doctor when it is okay for you to have sex. Try not to travel with your baby for 5 or 6 weeks. If you take a long car trip, make frequent stops to walk around and stretch. Avoid exhaustion  Rest every day. Try to nap when your baby naps. Ask another adult to be with you for a few days after delivery. Plan for  if you have other children. Stay flexible so you can eat at odd hours and sleep when you need to. Both you and your baby are making new schedules. Plan small trips to get out of the house. Change can make you feel less tired. Ask for help with housework, cooking, and shopping. Remind yourself that your job is to care for your baby. Know about help for postpartum depression  \"Baby blues\" are common for the first 1 to 2 weeks after birth. You may cry or feel sad or irritable for no reason. Rest whenever you can. Being tired makes it harder to handle your emotions. Go for walks with your baby. Talk to your partner, friends, and family about your feelings. If your symptoms last for more than a few weeks, or if you feel very depressed, ask your doctor for help. Postpartum depression can be treated. Support groups and counseling can help. Sometimes medicine can also help. Stay healthy  Eat healthy foods so you have more energy. If you breastfeed, avoid drugs. If you quit smoking during pregnancy, try to stay smoke-free. If you choose to have a drink now and then, have only one drink, and limit the number of occasions that you have a drink. Wait to breastfeed at least 2 hours after you have a drink to reduce the amount of alcohol the baby may get in the milk. Start daily exercise after 4 to 6 weeks, but rest when you feel tired. Learn exercises to tone your belly.  Try Kegel exercises to regain strength in your pelvic muscles. You can do these exercises while you stand or sit. (If doing these exercises causes pain, stop doing them and talk with your doctor.)  Squeeze your muscles as if you were trying not to pass gas. Or squeeze your muscles as if you were stopping the flow of urine. Your belly, legs, and buttocks shouldn't move. Hold the squeeze for 3 seconds, then relax for 5 to 10 seconds. Start with 3 seconds, then add 1 second each week until you are able to squeeze for 10 seconds. Repeat the exercise 10 times a session. Do 3 to 8 sessions a day. Find a class for you and your baby that has an exercise time. If you had a  birth, give yourself a bit more time before you exercise, and be careful. When should you call for help? Share this information with your partner, family, or a friend. They can help you watch for warning signs. Call 911  anytime you think you may need emergency care. For example, call if:    You have thoughts of harming yourself, your baby, or another person. You passed out (lost consciousness). You have chest pain, are short of breath, or cough up blood. You have a seizure. Call your doctor now or seek immediate medical care if:    You have signs of hemorrhage (too much bleeding), such as:  Heavy vaginal bleeding. This means that you are soaking through one or more pads in an hour. Or you pass blood clots bigger than an egg. Feeling dizzy or lightheaded, or you feel like you may faint. Feeling so tired or weak that you cannot do your usual activities. A fast or irregular heartbeat. New or worse belly pain. You have signs of infection, such as:  A fever. Vaginal discharge that smells bad. New or worse belly pain. You have symptoms of a blood clot in your leg (called a deep vein thrombosis), such as:  Pain in the calf, back of the knee, thigh, or groin. Redness and swelling in your leg or groin.      You have signs of preeclampsia, such as:  Sudden swelling of your face, hands, or feet. New vision problems (such as dimness, blurring, or seeing spots). A severe headache. Watch closely for changes in your health, and be sure to contact your doctor if:    Your vaginal bleeding isn't decreasing. You feel sad, anxious, or hopeless for more than a few days. You are having problems with your breasts or breastfeeding. Where can you learn more? Go to http://www.woods.com/ and enter A461 to learn more about \"After Your Delivery (the Postpartum Period): Care Instructions. \"  Current as of: February 23, 2022               Content Version: 13.5  © 8489-9916 [employer], [employer]. Care instructions adapted under license by [facility] ([facility]). If you have questions about a medical condition or this instruction, always ask your healthcare professional. [address] any warranty or liability for your use of this information.

## 2023-01-14 NOTE — PROGRESS NOTES
Discharge instructions completed.  Patient voiced understanding.  Prescription sent electronically.  Patient discharged home.  Ambulating.  Wheelchair refused.

## 2023-01-14 NOTE — LACTATION NOTE

## 2023-01-14 NOTE — DISCHARGE SUMMARY
[address] [place] Discharge Summary     Patient ID:  Jeffry Peña  [id number]  45 y.o.  1995    Admit date: 1/12/2023    Discharge date and time: No discharge date for patient encounter. Admitting Physician: Jimi Elizondo MD     Discharge Physician: Elli Anne M.D. Admission Diagnoses: Normal labor [O80, Z37.9]    Problem List: Hospital - Principal Problem (Resolved):    Normal labor  Active Problems:    * No active hospital problems. *   ; Other -   Patient Active Problem List   Diagnosis    Pregnancy    COVID-19 vaccine series completed    Family history of heart failure    Family history of Down syndrome    COVID-19 affecting pregnancy in first trimester    Anemia affecting pregnancy in second trimester        Discharge Diagnoses: Normal labor [O80, Z37.9]    Hospital Course: Jeffry Peña had unremarkeable progressive recovery. and Eating, ambulating, and voiding in a routine manner. Significant Diagnostic Studies: No results found for this or any previous visit (from the past 24 hour(s)). Procedures: spontaneous vaginal delivery    Discharge Exam:  /88   Pulse (!) 116   Temp 98.3 °F (36.8 °C) (Oral)   Resp 24   Ht 5' 2\" (1.575 m)   Wt 201 lb (91.2 kg)   LMP 04/14/2022   SpO2 95%   Breastfeeding Unknown   BMI 36.76 kg/m²      Heart: regular rate and rhythm, S1, S2 normal, no murmur, click, rub or gallop  Lungs:clear to auscultation bilaterally  Extremities: normal, atraumatic, no cyanosis or edema. No DVT  Incision/episiotomy: no significant drainage, no dehiscence, no significant erythema    Patient Instructions:   Current Discharge Medication List        START taking these medications    Details   oxyCODONE (ROXICODONE) 5 MG immediate release tablet Take 1 tablet by mouth every 6 hours as needed for Pain for up to 5 days.  Max Daily Amount: 20 mg  Qty: 15 tablet, Refills: 0    Comments: Reduce doses taken as pain becomes manageable  Associated Diagnoses: Normal labor      ibuprofen (ADVIL;MOTRIN) 800 MG tablet Take 1 tablet by mouth every 8 hours as needed for Pain  Qty: 40 tablet, Refills: 1           CONTINUE these medications which have CHANGED    Details   Hydrocort-Pramoxine, Perianal, (ANALPRAM HC) 2.5-1 % rectal cream Place rectally 3 times daily  Qty: 30 g, Refills: 1           CONTINUE these medications which have NOT CHANGED    Details   Ferrous Sulfate (SLOW FE PO) Take by mouth      esomeprazole (NEXIUM) 40 MG delayed release capsule Take 1 capsule by mouth daily  Qty: 30 capsule, Refills: 3      Ascorbic Acid (VITAMIN C PO) Take by mouth      Cholecalciferol (VITAMIN D3) 50 MCG (2000 UT) CAPS Take by mouth      Prenat w/o T-YO-Lqlxmuu-FA-DHA (PNV-DHA PO) Take by mouth            Activity: physical activity is restricted per discharge instructions  Diet: resume normal diet  Wound Care: Keep wound clean and dry, as directed    Follow-up with Kelsey in 2 weeks.     Signed:  Erwin Aviles MD  1/14/2023  11:42 AM

## 2023-01-25 ENCOUNTER — TELEPHONE (OUTPATIENT)
Dept: OBGYN CLINIC | Age: 28
End: 2023-01-25

## 2023-01-25 NOTE — TELEPHONE ENCOUNTER
Pt mother calls states that Ronel Hawthorne is impacted and she wants to know if they can try an enema vs going to the ER. Called and spoke with Ronel Hawthorne. She is aware that if she is impacted she may not be able to get the enema into the anus/rectum enough for it to work. She is advised that she really needs to be seen in the ER for them to manually remove the stool. After it is removed, she can continue with miralax and stool softeners prn. All questions answered. Call back prn. Voiced full understanding.

## 2023-01-26 ENCOUNTER — POSTPARTUM VISIT (OUTPATIENT)
Dept: OBGYN CLINIC | Age: 28
End: 2023-01-26
Payer: COMMERCIAL

## 2023-01-26 ENCOUNTER — TELEPHONE (OUTPATIENT)
Dept: OBGYN CLINIC | Age: 28
End: 2023-01-26

## 2023-01-26 VITALS
SYSTOLIC BLOOD PRESSURE: 98 MMHG | WEIGHT: 184.4 LBS | BODY MASS INDEX: 33.93 KG/M2 | TEMPERATURE: 98.7 F | DIASTOLIC BLOOD PRESSURE: 64 MMHG | HEIGHT: 62 IN

## 2023-01-26 DIAGNOSIS — Z13.89 SCREENING FOR GENITOURINARY CONDITION: ICD-10-CM

## 2023-01-26 DIAGNOSIS — R10.2 PELVIC PAIN IN FEMALE: Primary | ICD-10-CM

## 2023-01-26 DIAGNOSIS — R10.9 FLANK PAIN: ICD-10-CM

## 2023-01-26 DIAGNOSIS — R50.9 FEVER, UNSPECIFIED FEVER CAUSE: ICD-10-CM

## 2023-01-26 DIAGNOSIS — R31.9 HEMATURIA, UNSPECIFIED TYPE: ICD-10-CM

## 2023-01-26 LAB
BILIRUBIN, URINE, POC: NEGATIVE
BLOOD URINE, POC: NORMAL
GLUCOSE URINE, POC: NEGATIVE
KETONES, URINE, POC: NEGATIVE
LEUKOCYTE ESTERASE, URINE, POC: NORMAL
NITRITE, URINE, POC: NEGATIVE
PH, URINE, POC: 5 (ref 4.6–8)
PROTEIN,URINE, POC: NORMAL
SPECIFIC GRAVITY, URINE, POC: 1.02 (ref 1–1.03)
URINALYSIS CLARITY, POC: CLEAR
URINALYSIS COLOR, POC: YELLOW
UROBILINOGEN, POC: NORMAL

## 2023-01-26 PROCEDURE — 99214 OFFICE O/P EST MOD 30 MIN: CPT | Performed by: OBSTETRICS & GYNECOLOGY

## 2023-01-26 PROCEDURE — 76830 TRANSVAGINAL US NON-OB: CPT | Performed by: OBSTETRICS & GYNECOLOGY

## 2023-01-26 PROCEDURE — 81002 URINALYSIS NONAUTO W/O SCOPE: CPT | Performed by: OBSTETRICS & GYNECOLOGY

## 2023-01-26 RX ORDER — CEFTRIAXONE 500 MG/1
500 INJECTION, POWDER, FOR SOLUTION INTRAMUSCULAR; INTRAVENOUS ONCE
Status: COMPLETED | OUTPATIENT
Start: 2023-01-26 | End: 2023-01-26

## 2023-01-26 RX ORDER — CEPHALEXIN 500 MG/1
500 CAPSULE ORAL 4 TIMES DAILY
Qty: 28 CAPSULE | Refills: 0 | Status: SHIPPED | OUTPATIENT
Start: 2023-01-26 | End: 2023-02-02

## 2023-01-26 RX ADMIN — CEFTRIAXONE 500 MG: 500 INJECTION, POWDER, FOR SOLUTION INTRAMUSCULAR; INTRAVENOUS at 15:36

## 2023-01-26 RX ADMIN — CEFTRIAXONE 500 MG: 500 INJECTION, POWDER, FOR SOLUTION INTRAMUSCULAR; INTRAVENOUS at 15:35

## 2023-01-26 ASSESSMENT — PATIENT HEALTH QUESTIONNAIRE - PHQ9
SUM OF ALL RESPONSES TO PHQ9 QUESTIONS 1 & 2: 0
SUM OF ALL RESPONSES TO PHQ QUESTIONS 1-9: 0
1. LITTLE INTEREST OR PLEASURE IN DOING THINGS: 0
2. FEELING DOWN, DEPRESSED OR HOPELESS: 0
SUM OF ALL RESPONSES TO PHQ QUESTIONS 1-9: 0

## 2023-01-26 NOTE — PROGRESS NOTES
Rocephin 500mg IM x2    Given RGM    [id number] Exp May 2025  [id number]- Exp Aug 2024    Pt tolerated well, no adverse reactions

## 2023-01-26 NOTE — PROGRESS NOTES
female 2 weeks s/p vag delivery. No complaints. Hasn't had intercourse since delivery. Pregnancy and delivery were uncomplicated. Patient feels comfortable with caring for the baby. Breastfeeding yes. 2 days ago felt cold at night and severe constipation and now better for that. R flank pain   This am some pelvic pain today    Pt here with c/o fever (102.8), fatigue and feeling achy. She is breast feeding but denies redness, warm to touch. She has had 3 negative COVID tests and a negative flu test yesterday. She had tylenol at 11am, she last had ibuprofen 8 hours ago. Temp: 98.7    She has right side back pain, which she was thought was related to constipation. Constipation is better today. She did start having some pelvic discomfort today. Still with vaginal bleeding,not heavy. Last pap: 21 wnl   History of GDM no    BP 98/64 (Site: Left Upper Arm, Position: Sitting)   Temp 98.7 °F (37.1 °C)   Ht 5' 2\" (1.575 m)   Wt 184 lb 6.4 oz (83.6 kg)   LMP 2022   Breastfeeding Yes   BMI 33.73 kg/m²   Affect appropriate and bright  Lungs clear to auscultation bilaterally heart regular rate and rhythm. Breasts are soft no erythema no masses no sign of infection  Abdomen soft and nontender. No masses noted. Normal externalia genitalia. Healing well with no sign of infection  Vaginal mucosa and cervix appear normal no purulence small amount of normal postpartum lochia  Uterus and adnexae nontender and normal size. No cervical motion tenderness    Urine shows blood and small leukocytes but again she is immediately postpartum we will send this for culture      Encounter Diagnoses   Name Primary? Screening for genitourinary condition     Hematuria, unspecified type     Pelvic pain in female     Flank pain     Fever, unspecified fever cause Yes     Exam is suggestive of UTI possible developing pyelonephritis.   Patient is nontoxic in appearance no nausea or vomiting feels she can tolerate p.o. antibiotics. Would like to look at a pelvic ultrasound to evaluate the uterus even though is nontender to be sure there is no significant sign of retained products although delivery note she had no difficulty with the placenta coming out. Orders Placed This Encounter   Procedures    Culture, Urine    AMB POC URINALYSIS DIP STICK MANUAL W/O MICRO     Ultrasound was able to be done today please see full report under imaging. In general no evidence retained products of conception. It was noted that there was a stone in her bladder. Cursory look at the kidney especially on the right showed no significant fluid collection or hydronephrosis although still limited exam.    Discussed with the patient I think this also goes along with her exam that I think this is related to kidney where there is infection or just the stone would like to treat it as possible Julio we will go ahead and give her 1 g of Rocephin here in the office and put her on Keflex 500 4 times daily for 7 days. We will see her back in the office next week sooner if she has any problems or questions she will let us know if she has any acute changes.     Simon Rodriguez MD, MD

## 2023-01-28 LAB
BACTERIA SPEC CULT: NORMAL
BACTERIA SPEC CULT: NORMAL
SERVICE CMNT-IMP: NORMAL

## 2023-02-02 ENCOUNTER — POSTPARTUM VISIT (OUTPATIENT)
Dept: OBGYN CLINIC | Age: 28
End: 2023-02-02

## 2023-02-02 VITALS
WEIGHT: 183 LBS | DIASTOLIC BLOOD PRESSURE: 76 MMHG | HEIGHT: 62 IN | BODY MASS INDEX: 33.68 KG/M2 | SYSTOLIC BLOOD PRESSURE: 122 MMHG

## 2023-02-02 PROCEDURE — MISCGLOBALOB GLOBAL OB: Performed by: OBSTETRICS & GYNECOLOGY

## 2023-02-02 RX ORDER — FLUCONAZOLE 150 MG/1
TABLET ORAL
Qty: 2 TABLET | Refills: 0 | Status: SHIPPED | OUTPATIENT
Start: 2023-02-02

## 2023-02-02 ASSESSMENT — PATIENT HEALTH QUESTIONNAIRE - PHQ9
1. LITTLE INTEREST OR PLEASURE IN DOING THINGS: 0
SUM OF ALL RESPONSES TO PHQ QUESTIONS 1-9: 0
2. FEELING DOWN, DEPRESSED OR HOPELESS: 0
SUM OF ALL RESPONSES TO PHQ QUESTIONS 1-9: 0
SUM OF ALL RESPONSES TO PHQ QUESTIONS 1-9: 0
SUM OF ALL RESPONSES TO PHQ9 QUESTIONS 1 & 2: 0
SUM OF ALL RESPONSES TO PHQ QUESTIONS 1-9: 0

## 2023-02-02 NOTE — PROGRESS NOTES
3 Week Postpartum Visit    Name: Pattie Cotton    Date: 2023    Age: 28 y.o.    OB History          1    Para   1    Term   1       0    AB   0    Living   1         SAB        IAB        Ectopic        Molar        Multiple   0    Live Births   1              Ht 5' 2\" (1.575 m)   Wt 183 lb (83 kg)   LMP 2022        Delivered by Dr. Chloe Torres on 23 by . Infant's Name: Eliane Yadav Infant's Gender: female  Birth Weight: 7lb 4oz Feeding: breast milk feed exclusively   Desired Contraception:  wants to discuss  Last Pap smear date:21  Last Pap smear result:wnl    She was dx with kidney stone last week, bladder stone was seen on ultrasound in the office. Was treated here in the office with Rocephin and was sent home with Keflex. She was seen by urologist on Friday, had cystoscope. She is still having some back pain that radiates to her neck, happens when she goes from standing to sitting. The pain does not last very long. Otherwise she is feeling great. Itching started 2 days ago  Feeling almost completely better- passed stone    Current Outpatient Medications   Medication Sig Dispense Refill    Prenat w/o U-HK-Usqewer-FA-DHA (PNV-DHA PO) Take by mouth      cephALEXin (KEFLEX) 500 MG capsule Take 1 capsule by mouth 4 times daily for 7 days 28 capsule 0    ibuprofen (ADVIL;MOTRIN) 800 MG tablet Take 1 tablet by mouth every 8 hours as needed for Pain (Patient not taking: Reported on 2023) 40 tablet 1    Hydrocort-Pramoxine, Perianal, (ANALPRAM HC) 2.5-1 % rectal cream Place rectally 3 times daily (Patient not taking: No sig reported) 30 g 1     No current facility-administered medications for this visit.            Physical Exam  Physical Exam     Well healed perineum  Yeast on labia likely form abx  Rx diflucan  Well healed perineal body  Mood bright    Clive Coyle RN

## 2023-02-15 ENCOUNTER — PATIENT MESSAGE (OUTPATIENT)
Dept: OBGYN CLINIC | Age: 28
End: 2023-02-15

## 2023-02-15 RX ORDER — FLUCONAZOLE 100 MG/1
TABLET ORAL
Qty: 12 TABLET | Refills: 0 | Status: SHIPPED | OUTPATIENT
Start: 2023-02-15

## 2023-02-15 NOTE — TELEPHONE ENCOUNTER
Orders Placed This Encounter    fluconazole (DIFLUCAN) 100 MG tablet     Sig: Take 2 by mouth now and then one daily for 10 days     Dispense:  12 tablet     Refill:  0     Ok per Dr Magdaleno Johnson

## 2023-02-15 NOTE — TELEPHONE ENCOUNTER
From: Bogdan Ibrahim  To: Dr. Reyes Sea: 2/15/2023 11:20 AM EST  Subject: Chyrl Chew    I am at the pediatrician treating the baby for Chkenal Chew. Her peds told me to contact my OB to get something to treat myself for it too since Im breastfeeding. Do I need to come in and be seen or can something be called in? Thank you!

## 2023-03-08 ENCOUNTER — POSTPARTUM VISIT (OUTPATIENT)
Dept: OBGYN CLINIC | Age: 28
End: 2023-03-08
Payer: COMMERCIAL

## 2023-03-08 VITALS
BODY MASS INDEX: 33.27 KG/M2 | HEIGHT: 62 IN | WEIGHT: 180.8 LBS | SYSTOLIC BLOOD PRESSURE: 98 MMHG | DIASTOLIC BLOOD PRESSURE: 66 MMHG

## 2023-03-08 DIAGNOSIS — Z01.419 WELL WOMAN EXAM: Primary | ICD-10-CM

## 2023-03-08 DIAGNOSIS — Z13.89 SCREENING FOR GENITOURINARY CONDITION: ICD-10-CM

## 2023-03-08 DIAGNOSIS — Z13.0 SCREENING FOR IRON DEFICIENCY ANEMIA: ICD-10-CM

## 2023-03-08 LAB
BILIRUBIN, URINE, POC: NEGATIVE
BLOOD URINE, POC: NEGATIVE
GLUCOSE URINE, POC: NEGATIVE
HEMOGLOBIN, POC: 13.7 G/DL
KETONES, URINE, POC: NEGATIVE
LEUKOCYTE ESTERASE, URINE, POC: NEGATIVE
NITRITE, URINE, POC: NEGATIVE
PH, URINE, POC: 6 (ref 4.6–8)
PROTEIN,URINE, POC: NEGATIVE
SPECIFIC GRAVITY, URINE, POC: 1.01 (ref 1–1.03)
URINALYSIS CLARITY, POC: CLEAR
URINALYSIS COLOR, POC: YELLOW
UROBILINOGEN, POC: NORMAL

## 2023-03-08 PROCEDURE — 99902 PR PRENATAL VISIT: CPT | Performed by: OBSTETRICS & GYNECOLOGY

## 2023-03-08 PROCEDURE — 85018 HEMOGLOBIN: CPT | Performed by: OBSTETRICS & GYNECOLOGY

## 2023-03-08 PROCEDURE — 81002 URINALYSIS NONAUTO W/O SCOPE: CPT | Performed by: OBSTETRICS & GYNECOLOGY

## 2023-03-08 RX ORDER — ACETAMINOPHEN 160 MG
TABLET,DISINTEGRATING ORAL
COMMUNITY

## 2023-03-08 ASSESSMENT — PATIENT HEALTH QUESTIONNAIRE - PHQ9
SUM OF ALL RESPONSES TO PHQ QUESTIONS 1-9: 0
1. LITTLE INTEREST OR PLEASURE IN DOING THINGS: 0
2. FEELING DOWN, DEPRESSED OR HOPELESS: 0
SUM OF ALL RESPONSES TO PHQ QUESTIONS 1-9: 0
SUM OF ALL RESPONSES TO PHQ9 QUESTIONS 1 & 2: 0

## 2023-03-08 NOTE — PROGRESS NOTES
8 Week Postpartum Visit    Name: Madina Carbajal    Date: 3/8/2023    Age: 28 y.o.    OB History          1    Para   1    Term   1       0    AB   0    Living   1         SAB        IAB        Ectopic        Molar        Multiple   0    Live Births   1              BP 98/66 (Site: Right Upper Arm, Position: Sitting)   Ht 5' 2\" (1.575 m)   Wt 180 lb 12.8 oz (82 kg)   LMP 2022      Patient Active Problem List    Diagnosis Date Noted    Anemia affecting pregnancy in second trimester 10/03/2022    COVID-19 affecting pregnancy in first trimester 2022    Pregnancy 06/15/2022    COVID-19 vaccine series completed 06/15/2022    Family history of heart failure 06/15/2022    Family history of Down syndrome 06/15/2022      Delivered by Dr. Michelle Acevedo on 23 by Michelle Acevedo. Infant's Name: Coronalogan [place] Infant's Gender: female  Birth Weight: 7lb 4oz Feeding: breast milk feed exclusively   Desired Contraception:  wants OCP  Last Pap smear date:21  Last Pap smear result:wnl    Current Outpatient Medications   Medication Sig Dispense Refill    Ascorbic Acid (VITAMIN C PO) Take by mouth      Cholecalciferol (VITAMIN D3) 50 MCG (2000 UT) CAPS Take by mouth      Ferrous Sulfate (IRON PO) Take by mouth      Prenat w/o Z-GW-Wvdhmer-FA-DHA (PNV-DHA PO) Take by mouth      ibuprofen (ADVIL;MOTRIN) 800 MG tablet Take 1 tablet by mouth every 8 hours as needed for Pain (Patient not taking: Reported on 2023) 40 tablet 1    Hydrocort-Pramoxine, Perianal, (ANALPRAM HC) 2.5-1 % rectal cream Place rectally 3 times daily (Patient not taking: No sig reported) 30 g 1     No current facility-administered medications for this visit. Physical Exam  Physical Exam   Well healed perineal exam  Mood bright production great  Wants combo pills  RTO for annual in Dec    Karla Benoit was seen today for postpartum care.     Diagnoses and all orders for this visit:    Well woman exam    Screening for genitourinary condition  -     AMB POC URINALYSIS DIP STICK MANUAL W/O MICRO    Screening for iron deficiency anemia  -     AMB POC HEMOGLOBIN (HGB)      No follow-up provider specified.     Denisa Escalante RN

## 2023-05-03 ENCOUNTER — PATIENT MESSAGE (OUTPATIENT)
Dept: OBGYN CLINIC | Age: 28
End: 2023-05-03

## 2023-05-04 RX ORDER — ACETAMINOPHEN AND CODEINE PHOSPHATE 120; 12 MG/5ML; MG/5ML
1 SOLUTION ORAL DAILY
Qty: 90 TABLET | Refills: 1 | Status: SHIPPED | OUTPATIENT
Start: 2023-05-04

## 2023-05-04 NOTE — TELEPHONE ENCOUNTER
Orders Placed This Encounter    norethindrone (ORTHO MICRONOR) 0.35 MG tablet     Sig: Take 1 tablet by mouth daily     Dispense:  90 tablet     Refill:  1 yes

## 2024-03-11 SDOH — ECONOMIC STABILITY: FOOD INSECURITY: WITHIN THE PAST 12 MONTHS, THE FOOD YOU BOUGHT JUST DIDN'T LAST AND YOU DIDN'T HAVE MONEY TO GET MORE.: NEVER TRUE

## 2024-03-11 SDOH — ECONOMIC STABILITY: INCOME INSECURITY: HOW HARD IS IT FOR YOU TO PAY FOR THE VERY BASICS LIKE FOOD, HOUSING, MEDICAL CARE, AND HEATING?: NOT VERY HARD

## 2024-03-11 SDOH — ECONOMIC STABILITY: FOOD INSECURITY: WITHIN THE PAST 12 MONTHS, YOU WORRIED THAT YOUR FOOD WOULD RUN OUT BEFORE YOU GOT MONEY TO BUY MORE.: NEVER TRUE

## 2024-03-11 SDOH — ECONOMIC STABILITY: TRANSPORTATION INSECURITY
IN THE PAST 12 MONTHS, HAS LACK OF TRANSPORTATION KEPT YOU FROM MEETINGS, WORK, OR FROM GETTING THINGS NEEDED FOR DAILY LIVING?: NO

## 2024-03-11 SDOH — ECONOMIC STABILITY: HOUSING INSECURITY
IN THE LAST 12 MONTHS, WAS THERE A TIME WHEN YOU DID NOT HAVE A STEADY PLACE TO SLEEP OR SLEPT IN A SHELTER (INCLUDING NOW)?: NO

## 2024-03-11 NOTE — PROGRESS NOTES
HPI:  Ms. Pacheco is a 28 y.o.   OB History          1    Para   1    Term   1       0    AB   0    Living   1         SAB        IAB        Ectopic        Molar        Multiple   0    Live Births   1             who is here today for a well woman exam. She complains of ***     Date Performed Result   PAP 21 Wnl, cultures neg   Mammogram never na   Colonoscopy never na   Dexa never na                 GYN History           No LMP recorded. Cycle Length {Numbers; 0-100:60672} Lasting {Numbers; 0-10:12766}  {Pos/neg trace:41166} dysmenorrhea; {Pos/neg trace:96070} postcoital bleeding    Past Medical History:  Past Medical History:   Diagnosis Date    COVID        Past Surgical History:  Past Surgical History:   Procedure Laterality Date    WISDOM TOOTH EXTRACTION         Allergies:   No Known Allergies    Medication History:  Current Outpatient Medications   Medication Sig Dispense Refill    norethindrone (ORTHO MICRONOR) 0.35 MG tablet Take 1 tablet by mouth daily 90 tablet 1    Ascorbic Acid (VITAMIN C PO) Take by mouth      Cholecalciferol (VITAMIN D3) 50 MCG ( UT) CAPS Take by mouth      Ferrous Sulfate (IRON PO) Take by mouth      Prenat w/o L-TN-Cthdvwl-FA-DHA (PNV-DHA PO) Take by mouth      ibuprofen (ADVIL;MOTRIN) 800 MG tablet Take 1 tablet by mouth every 8 hours as needed for Pain (Patient not taking: Reported on 2023) 40 tablet 1    Hydrocort-Pramoxine, Perianal, (ANALPRAM HC) 2.5-1 % rectal cream Place rectally 3 times daily (Patient not taking: No sig reported) 30 g 1     No current facility-administered medications for this visit.       Social History:  Social History     Socioeconomic History    Marital status:      Spouse name: Not on file    Number of children: Not on file    Years of education: Not on file    Highest education level: Not on file   Occupational History    Not on file   Tobacco Use    Smoking status: Never    Smokeless tobacco: Never   Vaping Use

## 2024-03-12 ENCOUNTER — OFFICE VISIT (OUTPATIENT)
Dept: OBGYN CLINIC | Age: 29
End: 2024-03-12
Payer: COMMERCIAL

## 2024-03-12 VITALS
SYSTOLIC BLOOD PRESSURE: 120 MMHG | DIASTOLIC BLOOD PRESSURE: 80 MMHG | WEIGHT: 170.6 LBS | HEIGHT: 62 IN | BODY MASS INDEX: 31.39 KG/M2

## 2024-03-12 DIAGNOSIS — N63.20 MASS OF LEFT BREAST, UNSPECIFIED QUADRANT: ICD-10-CM

## 2024-03-12 DIAGNOSIS — Z01.419 WELL WOMAN EXAM: ICD-10-CM

## 2024-03-12 DIAGNOSIS — Z01.419 WELL WOMAN EXAM: Primary | ICD-10-CM

## 2024-03-12 DIAGNOSIS — Z13.89 SCREENING FOR GENITOURINARY CONDITION: ICD-10-CM

## 2024-03-12 LAB
BILIRUBIN, URINE, POC: NEGATIVE
BLOOD URINE, POC: NEGATIVE
GLUCOSE URINE, POC: NEGATIVE
KETONES, URINE, POC: NEGATIVE
LEUKOCYTE ESTERASE, URINE, POC: NEGATIVE
NITRITE, URINE, POC: NEGATIVE
PH, URINE, POC: 6 (ref 4.6–8)
PROTEIN,URINE, POC: NEGATIVE
SPECIFIC GRAVITY, URINE, POC: 1.01 (ref 1–1.03)
URINALYSIS CLARITY, POC: CLEAR
URINALYSIS COLOR, POC: YELLOW
UROBILINOGEN, POC: NORMAL

## 2024-03-12 PROCEDURE — 99395 PREV VISIT EST AGE 18-39: CPT | Performed by: OBSTETRICS & GYNECOLOGY

## 2024-03-12 PROCEDURE — 81002 URINALYSIS NONAUTO W/O SCOPE: CPT | Performed by: OBSTETRICS & GYNECOLOGY

## 2024-03-12 RX ORDER — BUSPIRONE HYDROCHLORIDE 10 MG/1
TABLET ORAL
COMMUNITY
Start: 2024-02-13

## 2024-03-12 ASSESSMENT — PATIENT HEALTH QUESTIONNAIRE - PHQ9
SUM OF ALL RESPONSES TO PHQ QUESTIONS 1-9: 0
1. LITTLE INTEREST OR PLEASURE IN DOING THINGS: 0
2. FEELING DOWN, DEPRESSED OR HOPELESS: 0
SUM OF ALL RESPONSES TO PHQ QUESTIONS 1-9: 0
SUM OF ALL RESPONSES TO PHQ QUESTIONS 1-9: 0
SUM OF ALL RESPONSES TO PHQ9 QUESTIONS 1 & 2: 0
SUM OF ALL RESPONSES TO PHQ QUESTIONS 1-9: 0

## 2024-03-12 NOTE — PROGRESS NOTES
HPI:  Ms. Pacheco is a 28 y.o.   OB History          1    Para   1    Term   1       0    AB   0    Living   1         SAB        IAB        Ectopic        Molar        Multiple   0    Live Births   1             who is here today for a well woman exam. She complains of has had 2 friends that have just been dx with breast cancer recently, her same age.  Wants to talk about her options. She has no family history of breast cancer  Doing great has lost 15 pounds  Concerned about breasts cancer-   No BC- stopped   Condoms     Date Performed Result   PAP 21 Wnl    Mammogram never    Colonoscopy never    Dexa never                  GYN History           No LMP recorded. Cycle Length 24-25 Lasting 5  trace positive dysmenorrhea; negative postcoital bleeding    Past Medical History:  Past Medical History:   Diagnosis Date    COVID        Past Surgical History:  Past Surgical History:   Procedure Laterality Date    WISDOM TOOTH EXTRACTION         Allergies:   No Known Allergies    Medication History:  Current Outpatient Medications   Medication Sig Dispense Refill    norethindrone (ORTHO MICRONOR) 0.35 MG tablet Take 1 tablet by mouth daily 90 tablet 1    Ascorbic Acid (VITAMIN C PO) Take by mouth      Cholecalciferol (VITAMIN D3) 50 MCG (2000 UT) CAPS Take by mouth      Ferrous Sulfate (IRON PO) Take by mouth      Prenat w/o Q-XW-Mgamyjx-FA-DHA (PNV-DHA PO) Take by mouth      ibuprofen (ADVIL;MOTRIN) 800 MG tablet Take 1 tablet by mouth every 8 hours as needed for Pain (Patient not taking: Reported on 2023) 40 tablet 1    Hydrocort-Pramoxine, Perianal, (ANALPRAM HC) 2.5-1 % rectal cream Place rectally 3 times daily (Patient not taking: No sig reported) 30 g 1     No current facility-administered medications for this visit.       Social History:  Social History     Socioeconomic History    Marital status:      Spouse name: Not on file    Number of children: Not on file    Years of

## 2024-03-17 LAB
COLLECTION METHOD: NORMAL
CYTOLOGIST CVX/VAG CYTO: NORMAL
CYTOLOGY CVX/VAG DOC THIN PREP: NORMAL
HPV REFLEX: NORMAL
Lab: NORMAL
PAP SOURCE: NORMAL
PATH REPORT.FINAL DX SPEC: NORMAL
STAT OF ADQ CVX/VAG CYTO-IMP: NORMAL

## 2024-03-19 ENCOUNTER — TELEPHONE (OUTPATIENT)
Dept: OBGYN CLINIC | Age: 29
End: 2024-03-19

## 2024-03-19 NOTE — TELEPHONE ENCOUNTER
Toño pt LVM requesting to have mammogram orders sent to [facility] as she is not able to get in with Julienne Medina until mid April. Message to [facility] staff for review.

## 2024-05-08 DIAGNOSIS — N63.20 MASS OF LEFT BREAST, UNSPECIFIED QUADRANT: ICD-10-CM

## 2024-05-08 DIAGNOSIS — N63.20 MASS OF LEFT BREAST, UNSPECIFIED QUADRANT: Primary | ICD-10-CM

## 2024-05-08 NOTE — PROGRESS NOTES
Orders Placed This Encounter    [facility]     Having at [facility]/faxed     Standing Status:   Future     Standing Expiration Date:   7/8/2025     Scheduling Instructions:      Having at [facility]/faxed

## 2025-04-22 ENCOUNTER — LAB (OUTPATIENT)
Dept: OBGYN CLINIC | Age: 30
End: 2025-04-22

## 2025-04-22 ENCOUNTER — RESULTS FOLLOW-UP (OUTPATIENT)
Dept: OBGYN CLINIC | Age: 30
End: 2025-04-22

## 2025-04-22 ENCOUNTER — PROCEDURE VISIT (OUTPATIENT)
Dept: OBGYN CLINIC | Age: 30
End: 2025-04-22
Payer: COMMERCIAL

## 2025-04-22 VITALS — BODY MASS INDEX: 30.25 KG/M2 | HEIGHT: 62 IN | WEIGHT: 164.4 LBS

## 2025-04-22 DIAGNOSIS — Z34.81 ENCOUNTER FOR SUPERVISION OF OTHER NORMAL PREGNANCY IN FIRST TRIMESTER: Primary | ICD-10-CM

## 2025-04-22 DIAGNOSIS — Z34.81 ENCOUNTER FOR SUPERVISION OF OTHER NORMAL PREGNANCY IN FIRST TRIMESTER: ICD-10-CM

## 2025-04-22 DIAGNOSIS — O36.80X0 ENCOUNTER TO DETERMINE FETAL VIABILITY OF PREGNANCY, SINGLE OR UNSPECIFIED FETUS: Primary | ICD-10-CM

## 2025-04-22 LAB
ABO + RH BLD: NORMAL
BASOPHILS # BLD: 0.04 K/UL (ref 0–0.2)
BASOPHILS NFR BLD: 0.4 % (ref 0–2)
BLOOD GROUP ANTIBODIES SERPL: NORMAL
DIFFERENTIAL METHOD BLD: ABNORMAL
EOSINOPHIL # BLD: 0.05 K/UL (ref 0–0.8)
EOSINOPHIL NFR BLD: 0.5 % (ref 0.5–7.8)
ERYTHROCYTE [DISTWIDTH] IN BLOOD BY AUTOMATED COUNT: 12.7 % (ref 11.9–14.6)
EST. AVERAGE GLUCOSE BLD GHB EST-MCNC: 105 MG/DL
HBA1C MFR BLD: 5.3 % (ref 0–5.6)
HBV SURFACE AG SER QL: NONREACTIVE
HCT VFR BLD AUTO: 37 % (ref 35.8–46.3)
HCV AB SER QL: NONREACTIVE
HGB BLD-MCNC: 12.6 G/DL (ref 11.7–15.4)
HIV 1+2 AB+HIV1 P24 AG SERPL QL IA: NONREACTIVE
HIV 1/2 RESULT COMMENT: NORMAL
IMM GRANULOCYTES # BLD AUTO: 0.03 K/UL (ref 0–0.5)
IMM GRANULOCYTES NFR BLD AUTO: 0.3 % (ref 0–5)
LYMPHOCYTES # BLD: 1.85 K/UL (ref 0.5–4.6)
LYMPHOCYTES NFR BLD: 18.5 % (ref 13–44)
MCH RBC QN AUTO: 26.9 PG (ref 26.1–32.9)
MCHC RBC AUTO-ENTMCNC: 34.1 G/DL (ref 31.4–35)
MCV RBC AUTO: 78.9 FL (ref 82–102)
MONOCYTES # BLD: 0.57 K/UL (ref 0.1–1.3)
MONOCYTES NFR BLD: 5.7 % (ref 4–12)
NEUTS SEG # BLD: 7.46 K/UL (ref 1.7–8.2)
NEUTS SEG NFR BLD: 74.6 % (ref 43–78)
NRBC # BLD: 0 K/UL (ref 0–0.2)
PLATELET # BLD AUTO: 214 K/UL (ref 150–450)
PMV BLD AUTO: 9.8 FL (ref 9.4–12.3)
RBC # BLD AUTO: 4.69 M/UL (ref 4.05–5.2)
RUBV IGG SERPL IA-ACNC: 21.4 IU/ML
T PALLIDUM AB SER QL IA: NONREACTIVE
WBC # BLD AUTO: 10 K/UL (ref 4.3–11.1)

## 2025-04-22 PROCEDURE — 76817 TRANSVAGINAL US OBSTETRIC: CPT | Performed by: OBSTETRICS & GYNECOLOGY

## 2025-04-24 LAB
BACTERIA SPEC CULT: NORMAL
SERVICE CMNT-IMP: NORMAL

## 2025-04-25 LAB — HGB FRACT BLD ELPH-IMP: NORMAL

## 2025-05-08 ENCOUNTER — INITIAL PRENATAL (OUTPATIENT)
Dept: OBGYN CLINIC | Age: 30
End: 2025-05-08

## 2025-05-08 DIAGNOSIS — Z34.81 ENCOUNTER FOR SUPERVISION OF OTHER NORMAL PREGNANCY IN FIRST TRIMESTER: Primary | ICD-10-CM

## 2025-05-08 DIAGNOSIS — Z3A.10 10 WEEKS GESTATION OF PREGNANCY: ICD-10-CM

## 2025-05-08 DIAGNOSIS — Z82.49 FAMILY HISTORY OF HEART FAILURE: ICD-10-CM

## 2025-05-08 DIAGNOSIS — Z82.79 FAMILY HISTORY OF DOWN SYNDROME: ICD-10-CM

## 2025-05-08 DIAGNOSIS — F41.9 ANXIETY: ICD-10-CM

## 2025-05-08 PROBLEM — K80.20 GALLSTONE: Status: ACTIVE | Noted: 2025-05-08

## 2025-05-08 PROBLEM — U07.1 COVID-19 AFFECTING PREGNANCY IN FIRST TRIMESTER: Status: RESOLVED | Noted: 2022-07-11 | Resolved: 2025-05-08

## 2025-05-08 PROBLEM — Z92.29 COVID-19 VACCINE SERIES COMPLETED: Status: RESOLVED | Noted: 2022-06-15 | Resolved: 2025-05-08

## 2025-05-08 PROBLEM — Z34.90 PREGNANCY: Status: ACTIVE | Noted: 2025-05-08

## 2025-05-08 PROBLEM — O99.012 ANEMIA AFFECTING PREGNANCY IN SECOND TRIMESTER: Status: RESOLVED | Noted: 2022-10-03 | Resolved: 2025-05-08

## 2025-05-08 PROBLEM — O98.511 COVID-19 AFFECTING PREGNANCY IN FIRST TRIMESTER: Status: RESOLVED | Noted: 2022-07-11 | Resolved: 2025-05-08

## 2025-05-08 PROBLEM — Z34.90 PREGNANCY: Status: RESOLVED | Noted: 2022-06-15 | Resolved: 2025-05-08

## 2025-05-08 PROBLEM — E66.9 OBESE: Status: ACTIVE | Noted: 2025-05-08

## 2025-05-08 RX ORDER — HYDROCORTISONE ACETATE PRAMOXINE HCL 2.5; 1 G/100G; G/100G
CREAM TOPICAL 3 TIMES DAILY
Qty: 30 G | Refills: 0 | Status: SHIPPED | OUTPATIENT
Start: 2025-05-08 | End: 2025-05-09

## 2025-05-08 NOTE — PROGRESS NOTES
Gisele Pacheco,30 yo   is here today for NOB talk. Today we discussed scheduled appointments, scheduled ultrasounds, nutrition, appropriate weight gain, exercise, travel, genetic testing, hospital classes and registration, breastfeeding/lactation services, flu vaccine, Tdap, RSV vaccine, glucola, GBS, COVID 19, and Zika virus. She wants genetic testing/NIPT.  FOB has an aunt with DS, her father had a h/o heart defect. She does not want to go to [facility] for anatomy. She has a h/o pp anxiety and was taking Buspar until her +PT. She is doing well off this medication. She has gallstones and will need surgery. It is recommended that she start taking asa 81mg and vitamin D 2000iu at 12 weeks, d/c asa at 36 weeks -to aid in prevention of high blood pressure in pregnancy. She is c/o hemorrhoids and is requesting a refill, sent to the pharmacy. She is aware that insurance may not cover. If insurance does not cover she can try hydrocortisone cream OTC. She is asked to return to the office in 2 weeks for NOB exam. All questions answered. Pt is encouraged to call the office with any questions or concerns.     Orders Placed This Encounter    Prenat w/o G-PO-Bvcydeb-FA-DHA (PNV-DHA PO)     Sig: Take by mouth    Hydrocort-Pramoxine, Perianal, (ANALPRAM HC) 2.5-1 % rectal cream     Sig: Place rectally 3 times daily     Dispense:  30 g     Refill:  0

## 2025-05-09 RX ORDER — HYDROCORTISONE ACETATE PRAMOXINE HCL 1; 1 G/100G; G/100G
CREAM TOPICAL
Qty: 30 G | Refills: 0 | Status: SHIPPED | OUTPATIENT
Start: 2025-05-09

## 2025-05-22 NOTE — PROGRESS NOTES
time, she does not know what his genetic condition is.     She does not want to go to [facility]    7. Family history of Down syndrome  Overview:  FOB with aunt with DS    Pt prefers not to go to [facility], will have NIPT  8. Screening for genitourinary condition  -     AMB POC OB URINE DIP  9. Screening examination for venereal disease  -     Chlamydia, Gonorrhea, Trichomoniasis; Future         Counseling was received regarding adverse effects of alcohol, breast vs bottle feeding, childbirth classes, environment or work hazards, lifestyle changes, negative effects of smoking, physical activity, prenatal nutrition, sexual activity, toxoplasmosis precautions which includes avoidance of cat feces and raw material, traveling.  Discussed prenatal care and questions answered.  Discussed  genetic testing options and she has decided NIPT.

## 2025-05-27 ENCOUNTER — ROUTINE PRENATAL (OUTPATIENT)
Dept: OBGYN CLINIC | Age: 30
End: 2025-05-27
Payer: COMMERCIAL

## 2025-05-27 VITALS
WEIGHT: 166.8 LBS | DIASTOLIC BLOOD PRESSURE: 64 MMHG | HEIGHT: 62 IN | BODY MASS INDEX: 30.69 KG/M2 | SYSTOLIC BLOOD PRESSURE: 116 MMHG

## 2025-05-27 DIAGNOSIS — Z34.82 PRENATAL CARE, SUBSEQUENT PREGNANCY, SECOND TRIMESTER: Primary | ICD-10-CM

## 2025-05-27 DIAGNOSIS — Z82.49 FAMILY HISTORY OF HEART FAILURE: ICD-10-CM

## 2025-05-27 DIAGNOSIS — K80.20 CALCULUS OF GALLBLADDER WITHOUT CHOLECYSTITIS WITHOUT OBSTRUCTION: ICD-10-CM

## 2025-05-27 DIAGNOSIS — Z11.3 SCREENING EXAMINATION FOR VENEREAL DISEASE: ICD-10-CM

## 2025-05-27 DIAGNOSIS — Z82.79 FAMILY HISTORY OF DOWN SYNDROME: ICD-10-CM

## 2025-05-27 DIAGNOSIS — F41.9 ANXIETY: ICD-10-CM

## 2025-05-27 DIAGNOSIS — Z13.89 SCREENING FOR GENITOURINARY CONDITION: ICD-10-CM

## 2025-05-27 DIAGNOSIS — E66.811 CLASS 1 OBESITY DUE TO EXCESS CALORIES WITHOUT SERIOUS COMORBIDITY WITH BODY MASS INDEX (BMI) OF 30.0 TO 30.9 IN ADULT: ICD-10-CM

## 2025-05-27 DIAGNOSIS — E66.09 CLASS 1 OBESITY DUE TO EXCESS CALORIES WITHOUT SERIOUS COMORBIDITY WITH BODY MASS INDEX (BMI) OF 30.0 TO 30.9 IN ADULT: ICD-10-CM

## 2025-05-27 DIAGNOSIS — Z3A.13 13 WEEKS GESTATION OF PREGNANCY: ICD-10-CM

## 2025-05-27 LAB
GLUCOSE URINE, POC: NEGATIVE
PROTEIN,URINE, POC: NEGATIVE

## 2025-05-27 PROCEDURE — 0500F INITIAL PRENATAL CARE VISIT: CPT | Performed by: STUDENT IN AN ORGANIZED HEALTH CARE EDUCATION/TRAINING PROGRAM

## 2025-05-27 PROCEDURE — 81002 URINALYSIS NONAUTO W/O SCOPE: CPT | Performed by: STUDENT IN AN ORGANIZED HEALTH CARE EDUCATION/TRAINING PROGRAM

## 2025-05-29 ENCOUNTER — RESULTS FOLLOW-UP (OUTPATIENT)
Dept: OBGYN CLINIC | Age: 30
End: 2025-05-29

## 2025-05-29 DIAGNOSIS — Z3A.14 14 WEEKS GESTATION OF PREGNANCY: Primary | ICD-10-CM

## 2025-05-31 LAB
Lab: NORMAL
NTRA FETAL FRACTION: NORMAL
NTRA GENDER OF FETUS: NORMAL
NTRA MONOSOMY X AGE-BASED RISK TEXT: NORMAL
NTRA MONOSOMY X RESULT TEXT: NORMAL
NTRA MONOSOMY X RISK SCORE TEXT: NORMAL
NTRA TRIPLOIDY RESULT TEXT: NORMAL
NTRA TRISOMY 13 AGE-BASED RISK TEXT: NORMAL
NTRA TRISOMY 13 RESULT TEXT: NORMAL
NTRA TRISOMY 13 RISK SCORE TEXT: NORMAL
NTRA TRISOMY 18 AGE-BASED RISK TEXT: NORMAL
NTRA TRISOMY 18 RESULT TEXT: NORMAL
NTRA TRISOMY 18 RISK SCORE TEXT: NORMAL
NTRA TRISOMY 21 AGE-BASED RISK TEXT: NORMAL
NTRA TRISOMY 21 RESULT TEXT: NORMAL
NTRA TRISOMY 21 RISK SCORE TEXT: NORMAL

## 2025-06-20 SDOH — ECONOMIC STABILITY: FOOD INSECURITY: WITHIN THE PAST 12 MONTHS, YOU WORRIED THAT YOUR FOOD WOULD RUN OUT BEFORE YOU GOT MONEY TO BUY MORE.: NEVER TRUE

## 2025-06-20 SDOH — ECONOMIC STABILITY: FOOD INSECURITY: WITHIN THE PAST 12 MONTHS, THE FOOD YOU BOUGHT JUST DIDN'T LAST AND YOU DIDN'T HAVE MONEY TO GET MORE.: NEVER TRUE

## 2025-06-20 SDOH — ECONOMIC STABILITY: INCOME INSECURITY: IN THE LAST 12 MONTHS, WAS THERE A TIME WHEN YOU WERE NOT ABLE TO PAY THE MORTGAGE OR RENT ON TIME?: NO

## 2025-06-20 SDOH — ECONOMIC STABILITY: TRANSPORTATION INSECURITY
IN THE PAST 12 MONTHS, HAS THE LACK OF TRANSPORTATION KEPT YOU FROM MEDICAL APPOINTMENTS OR FROM GETTING MEDICATIONS?: NO

## 2025-06-23 ENCOUNTER — ROUTINE PRENATAL (OUTPATIENT)
Dept: OBGYN CLINIC | Age: 30
End: 2025-06-23
Payer: COMMERCIAL

## 2025-06-23 VITALS — SYSTOLIC BLOOD PRESSURE: 110 MMHG | WEIGHT: 170 LBS | DIASTOLIC BLOOD PRESSURE: 74 MMHG | BODY MASS INDEX: 31.09 KG/M2

## 2025-06-23 DIAGNOSIS — Z34.82 PRENATAL CARE, SUBSEQUENT PREGNANCY, SECOND TRIMESTER: Primary | ICD-10-CM

## 2025-06-23 DIAGNOSIS — Z3A.17 17 WEEKS GESTATION OF PREGNANCY: ICD-10-CM

## 2025-06-23 DIAGNOSIS — Z13.89 SCREENING FOR GENITOURINARY CONDITION: ICD-10-CM

## 2025-06-23 LAB
GLUCOSE URINE, POC: NEGATIVE
PROTEIN,URINE, POC: NEGATIVE

## 2025-06-23 PROCEDURE — 81002 URINALYSIS NONAUTO W/O SCOPE: CPT | Performed by: OBSTETRICS & GYNECOLOGY

## 2025-06-23 PROCEDURE — 0502F SUBSEQUENT PRENATAL CARE: CPT | Performed by: OBSTETRICS & GYNECOLOGY

## 2025-06-23 RX ORDER — ASPIRIN 81 MG/1
81 TABLET, CHEWABLE ORAL DAILY
COMMUNITY

## 2025-06-23 NOTE — PROGRESS NOTES
Ptl precautions. Rtc 3 weeks for anatomy us here.  Having some nose bleeds. Reassured pt.  Has gallstones but doing well if follows bland diet.

## 2025-07-16 ENCOUNTER — PROCEDURE VISIT (OUTPATIENT)
Dept: OBGYN CLINIC | Age: 30
End: 2025-07-16
Payer: COMMERCIAL

## 2025-07-16 ENCOUNTER — ROUTINE PRENATAL (OUTPATIENT)
Dept: OBGYN CLINIC | Age: 30
End: 2025-07-16
Payer: COMMERCIAL

## 2025-07-16 VITALS
BODY MASS INDEX: 31.64 KG/M2 | WEIGHT: 171.96 LBS | SYSTOLIC BLOOD PRESSURE: 108 MMHG | DIASTOLIC BLOOD PRESSURE: 68 MMHG | HEIGHT: 62 IN

## 2025-07-16 VITALS — BODY MASS INDEX: 31.46 KG/M2 | WEIGHT: 172 LBS

## 2025-07-16 DIAGNOSIS — Z36.89 SCREENING, ANTENATAL, FOR FETAL ANATOMIC SURVEY: Primary | ICD-10-CM

## 2025-07-16 DIAGNOSIS — Z3A.20 20 WEEKS GESTATION OF PREGNANCY: ICD-10-CM

## 2025-07-16 DIAGNOSIS — Z13.89 SCREENING FOR GENITOURINARY CONDITION: ICD-10-CM

## 2025-07-16 DIAGNOSIS — Z34.82 PRENATAL CARE, SUBSEQUENT PREGNANCY, SECOND TRIMESTER: Primary | ICD-10-CM

## 2025-07-16 LAB
GLUCOSE URINE, POC: NEGATIVE
PROTEIN,URINE, POC: NORMAL

## 2025-07-16 PROCEDURE — 81002 URINALYSIS NONAUTO W/O SCOPE: CPT | Performed by: OBSTETRICS & GYNECOLOGY

## 2025-07-16 PROCEDURE — 76805 OB US >/= 14 WKS SNGL FETUS: CPT | Performed by: OBSTETRICS & GYNECOLOGY

## 2025-07-16 PROCEDURE — 0502F SUBSEQUENT PRENATAL CARE: CPT | Performed by: OBSTETRICS & GYNECOLOGY

## 2025-07-16 NOTE — PROGRESS NOTES
C/o nausea and vomiting but r/t food poisoning. Heartburn has tried tagamet and tums without relief.     Denies cramping, ctx, bleeding, spotting     Anatomy scan today   Patient Active Problem List    Diagnosis Date Noted    Pregnancy 05/08/2025    Gallstone 05/08/2025    Anxiety 05/08/2025    Obese 05/08/2025    Family history of heart failure 06/15/2022    Family history of Down syndrome 06/15/2022    NIPT LRF  Anatomy grossly nl

## 2025-08-13 ENCOUNTER — ROUTINE PRENATAL (OUTPATIENT)
Dept: OBGYN CLINIC | Age: 30
End: 2025-08-13
Payer: COMMERCIAL

## 2025-08-13 VITALS
BODY MASS INDEX: 32.42 KG/M2 | HEIGHT: 62 IN | SYSTOLIC BLOOD PRESSURE: 118 MMHG | WEIGHT: 176.2 LBS | DIASTOLIC BLOOD PRESSURE: 72 MMHG

## 2025-08-13 DIAGNOSIS — Z34.82 PRENATAL CARE, SUBSEQUENT PREGNANCY, SECOND TRIMESTER: Primary | ICD-10-CM

## 2025-08-13 DIAGNOSIS — Z3A.24 24 WEEKS GESTATION OF PREGNANCY: ICD-10-CM

## 2025-08-13 DIAGNOSIS — Z13.89 SCREENING FOR GENITOURINARY CONDITION: ICD-10-CM

## 2025-08-13 LAB
GLUCOSE URINE, POC: NEGATIVE
PROTEIN,URINE, POC: NEGATIVE

## 2025-08-13 PROCEDURE — 81002 URINALYSIS NONAUTO W/O SCOPE: CPT | Performed by: OBSTETRICS & GYNECOLOGY

## 2025-08-13 PROCEDURE — 0502F SUBSEQUENT PRENATAL CARE: CPT | Performed by: OBSTETRICS & GYNECOLOGY

## 2025-08-13 RX ORDER — FAMOTIDINE 20 MG/1
20 TABLET, FILM COATED ORAL 2 TIMES DAILY
COMMUNITY

## 2025-08-13 RX ORDER — SERTRALINE HYDROCHLORIDE 25 MG/1
25 TABLET, FILM COATED ORAL DAILY
Qty: 30 TABLET | Refills: 3 | Status: SHIPPED | OUTPATIENT
Start: 2025-08-13